# Patient Record
Sex: FEMALE | Race: WHITE | NOT HISPANIC OR LATINO | Employment: FULL TIME | URBAN - METROPOLITAN AREA
[De-identification: names, ages, dates, MRNs, and addresses within clinical notes are randomized per-mention and may not be internally consistent; named-entity substitution may affect disease eponyms.]

---

## 2017-09-14 ENCOUNTER — TRANSCRIBE ORDERS (OUTPATIENT)
Dept: ADMINISTRATIVE | Facility: HOSPITAL | Age: 62
End: 2017-09-14

## 2017-09-14 DIAGNOSIS — Z12.31 VISIT FOR SCREENING MAMMOGRAM: Primary | ICD-10-CM

## 2017-10-13 ENCOUNTER — HOSPITAL ENCOUNTER (OUTPATIENT)
Dept: MAMMOGRAPHY | Facility: CLINIC | Age: 62
Discharge: HOME | End: 2017-10-13
Payer: COMMERCIAL

## 2017-10-13 DIAGNOSIS — Z12.31 VISIT FOR SCREENING MAMMOGRAM: ICD-10-CM

## 2017-10-13 PROCEDURE — G0202 SCR MAMMO BI INCL CAD: HCPCS

## 2018-01-26 ENCOUNTER — OFFICE VISIT (OUTPATIENT)
Dept: URGENT CARE | Facility: CLINIC | Age: 63
End: 2018-01-26
Payer: COMMERCIAL

## 2018-01-26 VITALS
DIASTOLIC BLOOD PRESSURE: 80 MMHG | WEIGHT: 167.4 LBS | OXYGEN SATURATION: 100 % | HEIGHT: 68 IN | BODY MASS INDEX: 25.37 KG/M2 | HEART RATE: 89 BPM | SYSTOLIC BLOOD PRESSURE: 121 MMHG | RESPIRATION RATE: 16 BRPM | TEMPERATURE: 99.9 F

## 2018-01-26 DIAGNOSIS — R68.89 FLU-LIKE SYMPTOMS: Primary | ICD-10-CM

## 2018-01-26 DIAGNOSIS — J20.9 ACUTE BRONCHITIS, UNSPECIFIED ORGANISM: ICD-10-CM

## 2018-01-26 PROCEDURE — 99213 OFFICE O/P EST LOW 20 MIN: CPT | Performed by: PHYSICIAN ASSISTANT

## 2018-01-26 RX ORDER — ALBUTEROL SULFATE 90 UG/1
2 AEROSOL, METERED RESPIRATORY (INHALATION) EVERY 4 HOURS PRN
Qty: 1 INHALER | Refills: 0 | Status: SHIPPED | OUTPATIENT
Start: 2018-01-26 | End: 2021-11-15 | Stop reason: CLARIF

## 2018-01-26 RX ORDER — PREDNISONE 50 MG/1
50 TABLET ORAL DAILY
Qty: 5 TABLET | Refills: 0 | Status: SHIPPED | OUTPATIENT
Start: 2018-01-26 | End: 2018-01-31

## 2018-01-26 NOTE — PATIENT INSTRUCTIONS
Take steroid as prescribed  Use inhaler every 4 hours for wheezing and cough  otc medicines as needed for symptom control  Follow up with family dr if symptoms worsen or persist

## 2018-01-26 NOTE — PROGRESS NOTES
Assessment/Plan:      Diagnoses and all orders for this visit:    Flu-like symptoms    Acute bronchitis, unspecified organism  -     predniSONE 50 mg tablet; Take 1 tablet by mouth daily for 5 days  -     albuterol (PROVENTIL HFA,VENTOLIN HFA) 90 mcg/act inhaler; Inhale 2 puffs every 4 (four) hours as needed for wheezing        Patient Instructions   Take steroid as prescribed  Use inhaler every 4 hours for wheezing and cough  otc medicines as needed for symptom control  Follow up with family dr if symptoms worsen or persist      Subjective:     Patient ID: Flakito Cortez is a 58 y o  female  Cough   This is a new problem  The current episode started in the past 7 days (3 days ago)  The problem has been gradually worsening  The problem occurs constantly  The cough is non-productive  Associated symptoms include chills, a fever, myalgias, nasal congestion, postnasal drip, rhinorrhea and wheezing  Pertinent negatives include no chest pain, ear congestion, ear pain, headaches, heartburn, hemoptysis, rash, sore throat, shortness of breath, sweats or weight loss  Nothing aggravates the symptoms  She has tried OTC cough suppressant for the symptoms  The treatment provided no relief  Review of Systems   Constitutional: Positive for chills, fatigue and fever  Negative for weight loss  HENT: Positive for congestion, postnasal drip, rhinorrhea and sinus pressure  Negative for ear pain, mouth sores and sore throat  Respiratory: Positive for cough and wheezing  Negative for hemoptysis, choking, chest tightness, shortness of breath and stridor  Cardiovascular: Negative for chest pain  Gastrointestinal: Negative  Negative for heartburn  Musculoskeletal: Positive for myalgias  Skin: Negative for rash  Neurological: Negative for headaches  Objective:     Physical Exam   Constitutional: She appears well-developed and well-nourished  HENT:   Head: Normocephalic     Right Ear: Hearing, tympanic membrane, external ear and ear canal normal    Left Ear: Hearing, external ear and ear canal normal    Nose: Mucosal edema and rhinorrhea present  Mouth/Throat: Oropharynx is clear and moist and mucous membranes are normal    Cardiovascular: Normal rate, regular rhythm and normal pulses  Pulmonary/Chest: Effort normal  She has wheezes (diffuse)  Abdominal: Normal appearance and bowel sounds are normal  There is no tenderness

## 2018-05-03 ENCOUNTER — TRANSCRIBE ORDERS (OUTPATIENT)
Dept: ADMINISTRATIVE | Facility: HOSPITAL | Age: 63
End: 2018-05-03

## 2018-05-03 DIAGNOSIS — Z85.3 HX OF BREAST CANCER: Primary | ICD-10-CM

## 2018-05-17 ENCOUNTER — HOSPITAL ENCOUNTER (OUTPATIENT)
Dept: RADIOLOGY | Facility: HOSPITAL | Age: 63
Discharge: HOME | End: 2018-05-17
Attending: OBSTETRICS & GYNECOLOGY
Payer: COMMERCIAL

## 2018-05-17 DIAGNOSIS — Z85.3 HX OF BREAST CANCER: ICD-10-CM

## 2018-05-17 PROCEDURE — C8908 MRI W/O FOL W/CONT, BREAST,: HCPCS

## 2018-05-17 PROCEDURE — 0159T HB CAD BREAST MRI: CPT

## 2019-10-01 ENCOUNTER — TRANSCRIBE ORDERS (OUTPATIENT)
Dept: ADMINISTRATIVE | Facility: HOSPITAL | Age: 64
End: 2019-10-01

## 2019-10-01 DIAGNOSIS — Z12.31 SCREENING MAMMOGRAM FOR HIGH-RISK PATIENT: Primary | ICD-10-CM

## 2020-07-09 ENCOUNTER — TRANSCRIBE ORDERS (OUTPATIENT)
Dept: ADMINISTRATIVE | Facility: HOSPITAL | Age: 65
End: 2020-07-09

## 2020-07-09 DIAGNOSIS — H90.A32 MIXED CONDUCTIVE AND SENSORINEURAL HEARING LOSS OF LEFT EAR WITH RESTRICTED HEARING OF RIGHT EAR: Primary | ICD-10-CM

## 2020-07-20 ENCOUNTER — HOSPITAL ENCOUNTER (OUTPATIENT)
Dept: MRI IMAGING | Facility: HOSPITAL | Age: 65
Discharge: HOME | End: 2020-07-20
Payer: COMMERCIAL

## 2020-07-20 DIAGNOSIS — H90.A32 MIXED CONDUCTIVE AND SENSORINEURAL HEARING LOSS OF LEFT EAR WITH RESTRICTED HEARING OF RIGHT EAR: ICD-10-CM

## 2020-07-20 PROCEDURE — A9585 GADOBUTROL INJECTION: HCPCS | Performed by: RADIOLOGY

## 2020-07-20 PROCEDURE — 70553 MRI BRAIN STEM W/O & W/DYE: CPT

## 2020-07-20 RX ADMIN — GADOBUTROL 7 ML: 604.72 INJECTION INTRAVENOUS at 13:30

## 2021-07-09 ENCOUNTER — HOSPITAL ENCOUNTER (EMERGENCY)
Facility: HOSPITAL | Age: 66
Discharge: HOME | End: 2021-07-09
Attending: EMERGENCY MEDICINE
Payer: COMMERCIAL

## 2021-07-09 ENCOUNTER — APPOINTMENT (EMERGENCY)
Dept: RADIOLOGY | Facility: HOSPITAL | Age: 66
End: 2021-07-09
Payer: COMMERCIAL

## 2021-07-09 ENCOUNTER — APPOINTMENT (EMERGENCY)
Dept: CT IMAGING | Facility: HOSPITAL | Age: 66
End: 2021-07-09
Payer: COMMERCIAL

## 2021-07-09 VITALS
RESPIRATION RATE: 18 BRPM | SYSTOLIC BLOOD PRESSURE: 145 MMHG | TEMPERATURE: 97.7 F | DIASTOLIC BLOOD PRESSURE: 59 MMHG | OXYGEN SATURATION: 97 % | WEIGHT: 169.75 LBS | BODY MASS INDEX: 25.81 KG/M2 | HEART RATE: 90 BPM

## 2021-07-09 DIAGNOSIS — N28.1 BILATERAL RENAL CYSTS: ICD-10-CM

## 2021-07-09 DIAGNOSIS — E04.2 MULTIPLE THYROID NODULES: ICD-10-CM

## 2021-07-09 DIAGNOSIS — R07.9 CHEST PAIN: ICD-10-CM

## 2021-07-09 DIAGNOSIS — K29.70 GASTRITIS: ICD-10-CM

## 2021-07-09 DIAGNOSIS — R55 SYNCOPE: Primary | ICD-10-CM

## 2021-07-09 DIAGNOSIS — I77.810 THORACIC AORTIC ECTASIA (HCC): ICD-10-CM

## 2021-07-09 LAB
ALBUMIN SERPL BCP-MCNC: 4 G/DL (ref 3.5–5)
ALP SERPL-CCNC: 98 U/L (ref 46–116)
ALT SERPL W P-5'-P-CCNC: 22 U/L (ref 12–78)
ANION GAP SERPL CALCULATED.3IONS-SCNC: 9 MMOL/L (ref 4–13)
AST SERPL W P-5'-P-CCNC: 22 U/L (ref 5–45)
BACTERIA UR QL AUTO: NORMAL /HPF
BASOPHILS # BLD AUTO: 0.04 THOUSANDS/ΜL (ref 0–0.1)
BASOPHILS NFR BLD AUTO: 1 % (ref 0–1)
BILIRUB SERPL-MCNC: 0.31 MG/DL (ref 0.2–1)
BILIRUB UR QL STRIP: NEGATIVE
BUN SERPL-MCNC: 15 MG/DL (ref 5–25)
CALCIUM SERPL-MCNC: 9.3 MG/DL (ref 8.3–10.1)
CHLORIDE SERPL-SCNC: 104 MMOL/L (ref 100–108)
CK SERPL-CCNC: 78 U/L (ref 26–192)
CLARITY UR: CLEAR
CO2 SERPL-SCNC: 25 MMOL/L (ref 21–32)
COLOR UR: YELLOW
CREAT SERPL-MCNC: 1.05 MG/DL (ref 0.6–1.3)
EOSINOPHIL # BLD AUTO: 0.09 THOUSAND/ΜL (ref 0–0.61)
EOSINOPHIL NFR BLD AUTO: 2 % (ref 0–6)
ERYTHROCYTE [DISTWIDTH] IN BLOOD BY AUTOMATED COUNT: 13 % (ref 11.6–15.1)
GFR SERPL CREATININE-BSD FRML MDRD: 56 ML/MIN/1.73SQ M
GLUCOSE SERPL-MCNC: 94 MG/DL (ref 65–140)
GLUCOSE UR STRIP-MCNC: NEGATIVE MG/DL
HCT VFR BLD AUTO: 36.3 % (ref 34.8–46.1)
HGB BLD-MCNC: 11.9 G/DL (ref 11.5–15.4)
HGB UR QL STRIP.AUTO: NEGATIVE
IMM GRANULOCYTES # BLD AUTO: 0.02 THOUSAND/UL (ref 0–0.2)
IMM GRANULOCYTES NFR BLD AUTO: 0 % (ref 0–2)
INR PPP: 1.01 (ref 0.84–1.19)
KETONES UR STRIP-MCNC: NEGATIVE MG/DL
LEUKOCYTE ESTERASE UR QL STRIP: ABNORMAL
LIPASE SERPL-CCNC: 127 U/L (ref 73–393)
LYMPHOCYTES # BLD AUTO: 1.78 THOUSANDS/ΜL (ref 0.6–4.47)
LYMPHOCYTES NFR BLD AUTO: 36 % (ref 14–44)
MAGNESIUM SERPL-MCNC: 2.1 MG/DL (ref 1.6–2.6)
MCH RBC QN AUTO: 28.2 PG (ref 26.8–34.3)
MCHC RBC AUTO-ENTMCNC: 32.8 G/DL (ref 31.4–37.4)
MCV RBC AUTO: 86 FL (ref 82–98)
MONOCYTES # BLD AUTO: 0.39 THOUSAND/ΜL (ref 0.17–1.22)
MONOCYTES NFR BLD AUTO: 8 % (ref 4–12)
NEUTROPHILS # BLD AUTO: 2.57 THOUSANDS/ΜL (ref 1.85–7.62)
NEUTS SEG NFR BLD AUTO: 53 % (ref 43–75)
NITRITE UR QL STRIP: NEGATIVE
NON-SQ EPI CELLS URNS QL MICRO: NORMAL /HPF
NRBC BLD AUTO-RTO: 0 /100 WBCS
PH UR STRIP.AUTO: 7 [PH]
PLATELET # BLD AUTO: 303 THOUSANDS/UL (ref 149–390)
PMV BLD AUTO: 9.6 FL (ref 8.9–12.7)
POTASSIUM SERPL-SCNC: 4.1 MMOL/L (ref 3.5–5.3)
PROT SERPL-MCNC: 8.1 G/DL (ref 6.4–8.2)
PROT UR STRIP-MCNC: NEGATIVE MG/DL
PROTHROMBIN TIME: 13.1 SECONDS (ref 11.6–14.5)
RBC # BLD AUTO: 4.22 MILLION/UL (ref 3.81–5.12)
RBC #/AREA URNS AUTO: NORMAL /HPF
SODIUM SERPL-SCNC: 138 MMOL/L (ref 136–145)
SP GR UR STRIP.AUTO: <=1.005 (ref 1–1.03)
TROPONIN I SERPL-MCNC: <0.02 NG/ML
TROPONIN I SERPL-MCNC: <0.02 NG/ML
TSH SERPL DL<=0.05 MIU/L-ACNC: 1.11 UIU/ML (ref 0.36–3.74)
UROBILINOGEN UR QL STRIP.AUTO: 0.2 E.U./DL
WBC # BLD AUTO: 4.89 THOUSAND/UL (ref 4.31–10.16)
WBC #/AREA URNS AUTO: NORMAL /HPF

## 2021-07-09 PROCEDURE — 85610 PROTHROMBIN TIME: CPT | Performed by: EMERGENCY MEDICINE

## 2021-07-09 PROCEDURE — 82550 ASSAY OF CK (CPK): CPT | Performed by: EMERGENCY MEDICINE

## 2021-07-09 PROCEDURE — G1004 CDSM NDSC: HCPCS

## 2021-07-09 PROCEDURE — 85025 COMPLETE CBC W/AUTO DIFF WBC: CPT | Performed by: EMERGENCY MEDICINE

## 2021-07-09 PROCEDURE — 74174 CTA ABD&PLVS W/CONTRAST: CPT

## 2021-07-09 PROCEDURE — 36415 COLL VENOUS BLD VENIPUNCTURE: CPT | Performed by: EMERGENCY MEDICINE

## 2021-07-09 PROCEDURE — 83690 ASSAY OF LIPASE: CPT | Performed by: EMERGENCY MEDICINE

## 2021-07-09 PROCEDURE — 81001 URINALYSIS AUTO W/SCOPE: CPT | Performed by: EMERGENCY MEDICINE

## 2021-07-09 PROCEDURE — 84484 ASSAY OF TROPONIN QUANT: CPT | Performed by: EMERGENCY MEDICINE

## 2021-07-09 PROCEDURE — 71045 X-RAY EXAM CHEST 1 VIEW: CPT

## 2021-07-09 PROCEDURE — 93005 ELECTROCARDIOGRAM TRACING: CPT

## 2021-07-09 PROCEDURE — 83735 ASSAY OF MAGNESIUM: CPT | Performed by: EMERGENCY MEDICINE

## 2021-07-09 PROCEDURE — 99285 EMERGENCY DEPT VISIT HI MDM: CPT | Performed by: EMERGENCY MEDICINE

## 2021-07-09 PROCEDURE — 71275 CT ANGIOGRAPHY CHEST: CPT

## 2021-07-09 PROCEDURE — 96374 THER/PROPH/DIAG INJ IV PUSH: CPT

## 2021-07-09 PROCEDURE — 80053 COMPREHEN METABOLIC PANEL: CPT | Performed by: EMERGENCY MEDICINE

## 2021-07-09 PROCEDURE — 99285 EMERGENCY DEPT VISIT HI MDM: CPT

## 2021-07-09 PROCEDURE — 84443 ASSAY THYROID STIM HORMONE: CPT | Performed by: EMERGENCY MEDICINE

## 2021-07-09 PROCEDURE — 96361 HYDRATE IV INFUSION ADD-ON: CPT

## 2021-07-09 RX ORDER — ASPIRIN 81 MG/1
324 TABLET, CHEWABLE ORAL ONCE
Status: COMPLETED | OUTPATIENT
Start: 2021-07-09 | End: 2021-07-09

## 2021-07-09 RX ORDER — FAMOTIDINE 20 MG/1
20 TABLET, FILM COATED ORAL DAILY
Qty: 30 TABLET | Refills: 0 | Status: SHIPPED | OUTPATIENT
Start: 2021-07-09 | End: 2021-11-15 | Stop reason: CLARIF

## 2021-07-09 RX ORDER — SODIUM CHLORIDE 9 MG/ML
3 INJECTION INTRAVENOUS
Status: DISCONTINUED | OUTPATIENT
Start: 2021-07-09 | End: 2021-07-09 | Stop reason: HOSPADM

## 2021-07-09 RX ORDER — LIDOCAINE HYDROCHLORIDE 20 MG/ML
10 SOLUTION OROPHARYNGEAL ONCE
Status: COMPLETED | OUTPATIENT
Start: 2021-07-09 | End: 2021-07-09

## 2021-07-09 RX ORDER — MAGNESIUM HYDROXIDE/ALUMINUM HYDROXICE/SIMETHICONE 120; 1200; 1200 MG/30ML; MG/30ML; MG/30ML
15 SUSPENSION ORAL ONCE
Status: COMPLETED | OUTPATIENT
Start: 2021-07-09 | End: 2021-07-09

## 2021-07-09 RX ADMIN — ASPIRIN 81 MG CHEWABLE TABLET 324 MG: 81 TABLET CHEWABLE at 15:17

## 2021-07-09 RX ADMIN — FAMOTIDINE 20 MG: 10 INJECTION INTRAVENOUS at 13:27

## 2021-07-09 RX ADMIN — ALUMINUM HYDROXIDE, MAGNESIUM HYDROXIDE, AND SIMETHICONE 15 ML: 200; 200; 20 SUSPENSION ORAL at 13:30

## 2021-07-09 RX ADMIN — IOHEXOL 100 ML: 350 INJECTION, SOLUTION INTRAVENOUS at 13:52

## 2021-07-09 RX ADMIN — LIDOCAINE HYDROCHLORIDE 10 ML: 20 SOLUTION ORAL; TOPICAL at 13:29

## 2021-07-09 RX ADMIN — SODIUM CHLORIDE 1000 ML: 0.9 INJECTION, SOLUTION INTRAVENOUS at 13:00

## 2021-07-09 NOTE — ED NOTES
Aassisted OOB to BR- voided 800ml clear yellow urine - specimen sent to lab - denies dizziness, CP or SOB     Atul Nichole RN  07/09/21 1839

## 2021-07-09 NOTE — ED PROVIDER NOTES
History  Chief Complaint   Patient presents with    Syncope     pt states she was getting her nails done and started feeling hot and dizzy, pt then passed out  pt states episode of pressure in chest that radiated to back but has since resolved  pt denies CP, SOB, N/V      57-year-old female with a past medical history of degenerative joint disease, GERD, osteoarthritis status post right hip replacement presents with one syncopal event today  Patient states she was sitting down at a nail salon getting her nails done  Technician was pulling hard to pull off a nail extension from her right hand when patient felt severe pain, hot and dizzy, and passed out, family member at bedside states that patient sat back in the chair with her head off to the left and her eyes closed for approximately two minutes before patient woke up  Patient was immediately back to her baseline  Denies history of syncope, cardiac arrhythmia, seizure disorder  Patient states she felt a little queasy due to the pain but that she has no symptoms at this time  Patient also states she had epigastric and substernal chest pain radiating to her back earlier today  Denies history of aortic dissection or pancreatitis  Patient does states she has a history of gastritis but is not on medication  Denies chest or abdominal pain in the ER  No recent changes to medications  No recent illnesses  Patient denies illicit drug use  Review of medical chart shows no recent hospitalizations  Family is at bedside  History provided by:  Patient, medical records and relative   used: No    Syncope  Episode history:  Single  Most recent episode:   Today  Duration:  2 minutes  Timing:  Constant  Progression:  Resolved  Chronicity:  New  Context: sitting down    Context: not blood draw, not bowel movement, not dehydration, not exertion, not inactivity, not medication change, not with normal activity, not sight of blood, not standing up and not urination    Witnessed: yes    Relieved by:  Nothing  Worsened by:  Nothing  Ineffective treatments:  None tried  Associated symptoms: no anxiety, no chest pain, no confusion, no diaphoresis, no difficulty breathing, no dizziness, no fever, no focal sensory loss, no focal weakness, no headaches, no malaise/fatigue, no nausea, no palpitations, no recent fall, no recent injury, no recent surgery, no rectal bleeding, no seizures, no shortness of breath, no visual change, no vomiting and no weakness    Risk factors: no congenital heart disease, no coronary artery disease, no seizures and no vascular disease        Prior to Admission Medications   Prescriptions Last Dose Informant Patient Reported? Taking? albuterol (PROVENTIL HFA,VENTOLIN HFA) 90 mcg/act inhaler   No No   Sig: Inhale 2 puffs every 4 (four) hours as needed for wheezing   calcium carbonate (OS-PRETTY) 1250 (500 CA) MG tablet   Yes No   Sig: Take 1 tablet by mouth daily  Facility-Administered Medications: None       Past Medical History:   Diagnosis Date    History of right breast cancer        Past Surgical History:   Procedure Laterality Date     SECTION      JOINT REPLACEMENT      TOTAL HIP ARTHROPLASTY      US GUIDED BREAST BIOPSY LEFT COMPLETE Left 2016       History reviewed  No pertinent family history  I have reviewed and agree with the history as documented  E-Cigarette/Vaping    E-Cigarette Use Never User      E-Cigarette/Vaping Substances     Social History     Tobacco Use    Smoking status: Never Smoker    Smokeless tobacco: Never Used   Vaping Use    Vaping Use: Never used   Substance Use Topics    Alcohol use: Never    Drug use: Never       Review of Systems   Constitutional: Negative for chills, diaphoresis, fatigue, fever and malaise/fatigue  HENT: Negative for congestion, drooling, facial swelling, nosebleeds, sneezing, trouble swallowing and voice change      Eyes: Negative for photophobia, pain and visual disturbance  Respiratory: Negative for cough, chest tightness, shortness of breath and wheezing  Cardiovascular: Positive for syncope  Negative for chest pain, palpitations and leg swelling  Gastrointestinal: Negative for abdominal pain, constipation, diarrhea, nausea and vomiting  Genitourinary: Negative for decreased urine volume, difficulty urinating, dysuria, flank pain, frequency, hematuria, urgency, vaginal bleeding and vaginal discharge  Musculoskeletal: Negative for arthralgias, back pain, myalgias, neck pain and neck stiffness  Skin: Negative for color change, pallor, rash and wound  Allergic/Immunologic: Negative for immunocompromised state  Neurological: Positive for syncope  Negative for dizziness, tremors, focal weakness, seizures, facial asymmetry, speech difficulty, weakness, light-headedness, numbness and headaches  Hematological: Negative for adenopathy  Psychiatric/Behavioral: Negative for agitation, confusion, hallucinations and suicidal ideas  The patient is not nervous/anxious  Physical Exam  Physical Exam  Vitals and nursing note reviewed  Exam conducted with a chaperone present  Constitutional:       General: She is not in acute distress  Appearance: Normal appearance  She is well-developed and normal weight  She is not ill-appearing, toxic-appearing or diaphoretic  Comments: Well appearing, in no acute distress   HENT:      Head: Normocephalic and atraumatic  Jaw: There is normal jaw occlusion  Right Ear: Hearing, tympanic membrane, ear canal and external ear normal  There is no impacted cerumen  No mastoid tenderness  No hemotympanum  Left Ear: Hearing, tympanic membrane, ear canal and external ear normal  There is no impacted cerumen  No mastoid tenderness  No hemotympanum  Nose: Nose normal       Right Nostril: No epistaxis  Left Nostril: No epistaxis        Right Sinus: No maxillary sinus tenderness or frontal sinus tenderness  Left Sinus: No maxillary sinus tenderness or frontal sinus tenderness  Mouth/Throat:      Lips: Pink  No lesions  Mouth: Mucous membranes are moist  No lacerations or angioedema  Tongue: No lesions  Tongue does not deviate from midline  Palate: No mass and lesions  Pharynx: Oropharynx is clear  Uvula midline  No pharyngeal swelling, oropharyngeal exudate, posterior oropharyngeal erythema or uvula swelling  Tonsils: No tonsillar exudate or tonsillar abscesses  Eyes:      General: Lids are normal  Vision grossly intact  Gaze aligned appropriately  No visual field deficit or scleral icterus  Right eye: No discharge  Left eye: No discharge  Extraocular Movements: Extraocular movements intact  Right eye: No nystagmus  Left eye: No nystagmus  Conjunctiva/sclera: Conjunctivae normal       Right eye: Right conjunctiva is not injected  Left eye: Left conjunctiva is not injected  Pupils: Pupils are equal, round, and reactive to light  Neck:      Thyroid: No thyroid mass or thyromegaly  Trachea: Trachea and phonation normal    Cardiovascular:      Rate and Rhythm: Normal rate and regular rhythm  Pulses: Normal pulses  Radial pulses are 2+ on the right side and 2+ on the left side  Dorsalis pedis pulses are 2+ on the right side and 2+ on the left side  Heart sounds: Normal heart sounds, S1 normal and S2 normal    Pulmonary:      Effort: Pulmonary effort is normal  No tachypnea, accessory muscle usage, respiratory distress or retractions  Breath sounds: Normal breath sounds and air entry  No stridor or decreased air movement  No decreased breath sounds, wheezing, rhonchi or rales  Chest:      Chest wall: No tenderness  Abdominal:      General: Abdomen is flat  Bowel sounds are normal  There is no distension  Palpations: Abdomen is soft  Abdomen is not rigid  There is no mass  Tenderness: There is no abdominal tenderness  There is no right CVA tenderness, left CVA tenderness, guarding or rebound  Negative signs include Avalos's sign and McBurney's sign  Hernia: No hernia is present  Musculoskeletal:         General: No swelling, tenderness, deformity or signs of injury  Normal range of motion  Cervical back: Full passive range of motion without pain, normal range of motion and neck supple  No rigidity  No spinous process tenderness or muscular tenderness  Right lower leg: No edema  Left lower leg: No edema  Lymphadenopathy:      Cervical: No cervical adenopathy  Skin:     General: Skin is warm and dry  Capillary Refill: Capillary refill takes less than 2 seconds  Coloration: Skin is not ashen, cyanotic, jaundiced, mottled, pale or sallow  Findings: No abrasion, abscess, acne, bruising, burn, ecchymosis, erythema, signs of injury, laceration, lesion, petechiae, rash or wound  Rash is not macular or papular  Neurological:      General: No focal deficit present  Mental Status: She is alert and oriented to person, place, and time  Mental status is at baseline  She is not disoriented  GCS: GCS eye subscore is 4  GCS verbal subscore is 5  GCS motor subscore is 6  Cranial Nerves: Cranial nerves are intact  No cranial nerve deficit, dysarthria or facial asymmetry  Sensory: Sensation is intact  No sensory deficit  Motor: Motor function is intact  No weakness, tremor, atrophy, abnormal muscle tone or seizure activity  Coordination: Coordination is intact  Coordination normal       Gait: Gait is intact  Gait normal       Comments: Patient is AAOx4, GCS 15; speaking clearly and appropriately; motor and sensation intact; visual fields intact; cranial nerves II-XII grossly intact; no facial droop, slurred speech or arm drift   Psychiatric:         Attention and Perception: Attention and perception normal  She is attentive  Mood and Affect: Mood and affect normal          Speech: Speech normal          Behavior: Behavior normal  Behavior is cooperative  Thought Content:  Thought content normal          Cognition and Memory: Cognition and memory normal          Judgment: Judgment normal          Vital Signs  ED Triage Vitals   Temperature Pulse Respirations Blood Pressure SpO2   07/09/21 1208 07/09/21 1208 07/09/21 1208 07/09/21 1211 07/09/21 1208   97 7 °F (36 5 °C) 71 18 140/67 100 %      Temp Source Heart Rate Source Patient Position - Orthostatic VS BP Location FiO2 (%)   07/09/21 1208 07/09/21 1208 07/09/21 1208 07/09/21 1208 --   Temporal Monitor Lying Left arm       Pain Score       --                  Vitals:    07/09/21 1530 07/09/21 1545 07/09/21 1600 07/09/21 1615   BP: 130/72 125/67 134/67 170/81   Pulse: 70 79 72 85   Patient Position - Orthostatic VS: Sitting  Lying Lying         Visual Acuity      ED Medications  Medications   sodium chloride (PF) 0 9 % injection 3 mL (has no administration in time range)   sodium chloride 0 9 % bolus 1,000 mL (0 mL Intravenous Stopped 7/9/21 1515)   famotidine (PEPCID) injection 20 mg (20 mg Intravenous Given 7/9/21 1327)   Lidocaine Viscous HCl (XYLOCAINE) 2 % mucosal solution 10 mL (10 mL Swish & Swallow Given 7/9/21 1329)   aluminum-magnesium hydroxide-simethicone (MYLANTA) oral suspension 15 mL (15 mL Oral Given 7/9/21 1330)   iohexol (OMNIPAQUE) 350 MG/ML injection (SINGLE-DOSE) 100 mL (100 mL Intravenous Given 7/9/21 1352)   aspirin chewable tablet 324 mg (324 mg Oral Given 7/9/21 1517)       Diagnostic Studies  Results Reviewed     Procedure Component Value Units Date/Time    Troponin I repeat in 3hrs [[id number]]  (Normal) Collected: 07/09/21 1610    Lab Status: Final result Specimen: Blood from Arm, Left Updated: 07/09/21 1633     Troponin I <0 02 ng/mL     Urine Microscopic [[id number]]  (Normal) Collected: 07/09/21 1516    Lab Status: Final result Specimen: Urine, Clean Catch Updated: 07/09/21 1527     RBC, UA 1-2 /hpf      WBC, UA 2-4 /hpf      Epithelial Cells Occasional /hpf      Bacteria, UA None Seen /hpf     UA w Reflex to Microscopic w Reflex to Culture [[id number]]  (Abnormal) Collected: 07/09/21 1516    Lab Status: Final result Specimen: Urine, Clean Catch Updated: 07/09/21 1521     Color, UA Yellow     Clarity, UA Clear     Specific Gravity, UA <=1 005     pH, UA 7 0     Leukocytes, UA Trace     Nitrite, UA Negative     Protein, UA Negative mg/dl      Glucose, UA Negative mg/dl      Ketones, UA Negative mg/dl      Urobilinogen, UA 0 2 E U /dl      Bilirubin, UA Negative     Blood, UA Negative    TSH, 3rd generation [[id number]]  (Normal) Collected: 07/09/21 1232    Lab Status: Final result Specimen: Blood from Arm, Left Updated: 07/09/21 1306     TSH 3RD GENERATON 1 115 uIU/mL     Narrative:      Patients undergoing fluorescein dye angiography may retain small amounts of fluorescein in the body for 48-72 hours post procedure  Samples containing fluorescein can produce falsely depressed TSH values  If the patient had this procedure,a specimen should be resubmitted post fluorescein clearance        Troponin I [[id number]]  (Normal) Collected: 07/09/21 1232    Lab Status: Final result Specimen: Blood from Arm, Left Updated: 07/09/21 1259     Troponin I <0 02 ng/mL     Protime-INR [[id number]]  (Normal) Collected: 07/09/21 1232    Lab Status: Final result Specimen: Blood from Arm, Left Updated: 07/09/21 1254     Protime 13 1 seconds      INR 1 01    Lipase [[id number]]  (Normal) Collected: 07/09/21 1232    Lab Status: Final result Specimen: Blood from Arm, Left Updated: 07/09/21 1254     Lipase 127 u/L     Comprehensive metabolic panel [[id number]] Collected: 07/09/21 1232    Lab Status: Final result Specimen: Blood from Arm, Left Updated: 07/09/21 1254     Sodium 138 mmol/L      Potassium 4 1 mmol/L      Chloride 104 mmol/L      CO2 25 mmol/L      ANION GAP 9 mmol/L      BUN 15 mg/dL      Creatinine 1 05 mg/dL      Glucose 94 mg/dL      Calcium 9 3 mg/dL      AST 22 U/L      ALT 22 U/L      Alkaline Phosphatase 98 U/L      Total Protein 8 1 g/dL      Albumin 4 0 g/dL      Total Bilirubin 0 31 mg/dL      eGFR 56 ml/min/1 73sq m     Narrative:      [place] guidelines for Chronic Kidney Disease (CKD):     Stage 1 with normal or high GFR (GFR > 90 mL/min/1 73 square meters)    Stage 2 Mild CKD (GFR = 60-89 mL/min/1 73 square meters)    Stage 3A Moderate CKD (GFR = 45-59 mL/min/1 73 square meters)    Stage 3B Moderate CKD (GFR = 30-44 mL/min/1 73 square meters)    Stage 4 Severe CKD (GFR = 15-29 mL/min/1 73 square meters)    Stage 5 End Stage CKD (GFR <15 mL/min/1 73 square meters)  Note: GFR calculation is accurate only with a steady state creatinine    Magnesium [[id number]]  (Normal) Collected: 07/09/21 1232    Lab Status: Final result Specimen: Blood from Arm, Left Updated: 07/09/21 1254     Magnesium 2 1 mg/dL     CK (with reflex to MB) [[id number]]  (Normal) Collected: 07/09/21 1232    Lab Status: Final result Specimen: Blood from Arm, Left Updated: 07/09/21 1254     Total CK 78 U/L     CBC and differential [[id number]] Collected: 07/09/21 1232    Lab Status: Final result Specimen: Blood from Arm, Left Updated: 07/09/21 1239     WBC 4 89 Thousand/uL      RBC 4 22 Million/uL      Hemoglobin 11 9 g/dL      Hematocrit 36 3 %      MCV 86 fL      MCH 28 2 pg      MCHC 32 8 g/dL      RDW 13 0 %      MPV 9 6 fL      Platelets 884 Thousands/uL      nRBC 0 /100 WBCs      Neutrophils Relative 53 %      Immat GRANS % 0 %      Lymphocytes Relative 36 %      Monocytes Relative 8 %      Eosinophils Relative 2 %      Basophils Relative 1 %      Neutrophils Absolute 2 57 Thousands/µL      Immature Grans Absolute 0 02 Thousand/uL      Lymphocytes Absolute 1 78 Thousands/µL      Monocytes Absolute 0 39 Thousand/µL      Eosinophils Absolute 0 09 Thousand/µL      Basophils Absolute 0 04 Thousands/µL                  CTA dissection protocol chest/abdomen/pelvis   Final Result by Konrad Macias MD (07/09 1416)      No evidence of acute aortic syndrome  No acute findings in the chest, abdomen or pelvis  Workstation performed: [id number]         X-ray chest 1 view portable   ED Interpretation by Carl Thrasher MD (07/09 1306)   Chest x-ray read and interpreted by me  Negative for pneumothorax, mediastinal widening, rib fracture, focal consolidation or pleural effusion  Final Result by Salvador Morris MD (07/09 1420)      No acute cardiopulmonary disease  Workstation performed: [id number]                    Procedures  ECG 12 Lead Documentation Only    Date/Time: 7/9/2021 12:10 PM  Performed by: Carl Thrasher MD  Authorized by: Carl Thrasher MD     Indications / Diagnosis:  Syncope  ECG reviewed by me, the ED Provider: yes    Patient location:  ED  Previous ECG:     Comparison to cardiac monitor: Yes    Interpretation:     Interpretation: normal    Rate:     ECG rate:  63bpm    ECG rate assessment: normal    Rhythm:     Rhythm: sinus rhythm    Ectopy:     Ectopy: none    QRS:     QRS axis:  Normal  Conduction:     Conduction: normal    ST segments:     ST segments:  Normal  T waves:     T waves: flattening and inverted      Flattening:  AVL and V2    Inverted:  AVR and V1  Comments:      No STEMI  CA 206ms  QRS 78ms  QT 425ms    Cardiac monitoring ordered  Heart rate and rhythm as above  I have personally read and interpreted the EKG as above  ECG 12 Lead Documentation Only    Date/Time: 7/9/2021 4:07 PM  Performed by: Carl Thrasher MD  Authorized by:  Carl Thrasher MD     Indications / Diagnosis:  Chest pain  ECG reviewed by me, the ED Provider: yes    Patient location:  ED  Previous ECG:     Comparison to cardiac monitor: Yes    Interpretation:     Interpretation: normal    Rate:     ECG rate:  73bpm    ECG rate assessment: normal    Rhythm:     Rhythm: sinus rhythm    Ectopy:     Ectopy: none    QRS:     QRS axis:  Normal  Conduction:     Conduction: normal    ST segments:     ST segments:  Normal  T waves:     T waves: flattening and inverted      Flattening:  AVL    Inverted:  AVR and V1  Comments:      No STEMI  NE 196ms  QRS 78ms  QT 458ms    Cardiac monitoring ordered  Heart rate and rhythm as above  I have personally read and interpreted the EKG as above  ED Course  ED Course as of Jul 09 1642 Fri Jul 09, 2021   1356 Reassess patient  She has no complaints at this time and denies chest pain  1358 Repeat troponin and EKG at 1530      1400 HEART Score 2      1401 Orthostatic positive      1439 CTA:IMPRESSION:     No evidence of acute aortic syndrome      No acute findings in the chest, abdomen or pelvis         1440 CXR:IMPRESSION:     No acute cardiopulmonary disease  [address] patient  Patient has had no chest pain or syncopal events while in the emergency department  She has no complaints  Reviewed labs and imaging  Plan for discharge with primary care provider follow-up for possible gastritis  Will place her on Pepcid  Cardiology referral for chest pain and syncopal event  Vascular surgery referral for ectatic ascending thoracic aorta  Work note provided  Strict return to ER precautions discussed with and acknowledged by patient            1641 Repeat troponin normal             MDM  Number of Diagnoses or Management Options     Amount and/or Complexity of Data Reviewed  Clinical lab tests: reviewed and ordered  Tests in the radiology section of CPT®: reviewed and ordered  Tests in the medicine section of CPT®: ordered and reviewed  Obtain history from someone other than the patient: yes (Family at bedside)  Review and summarize past medical records: yes  Independent visualization of images, tracings, or specimens: yes (EKG, chest x-ray)    Risk of Complications, Morbidity, and/or Mortality  Presenting problems: moderate  Diagnostic procedures: moderate  Management options: moderate    Patient Progress  Patient progress: stable      Disposition  Final diagnoses:   Syncope   Chest pain   Thoracic aortic ectasia (HCC)   Multiple thyroid nodules   Bilateral renal cysts   Gastritis     Time reflects when diagnosis was documented in both MDM as applicable and the Disposition within this note     Time User Action Codes Description Comment    7/9/2021  1:21 PM Idalmis Emelia Add [R55] Syncope     7/9/2021  2:01 PM Idalmis Emelia Add [R07 9] Chest pain     7/9/2021  2:41 PM Idalmis Emelia Add [T80 825] Thoracic aortic ectasia ([address] )     7/9/2021  2:41 PM Idalmis Emelia Add [E04 2] Multiple thyroid nodules     7/9/2021  2:42 PM Idalmis Emelia Add [N28 1] Bilateral renal cysts     7/9/2021  4:25 PM Idalmis Emelia Add [K29 70] Gastritis       ED Disposition     None      Follow-up Information     Follow up With Specialties Details Why Contact Info    Christin Gil DO Family Medicine Call in 1 day Please follow-up with your primary care provider regarding your thyroid nodules as you may require outpatient workup  [address] [place], [place] [address]      Chun Hwang, DO Cardiology In 3 days Please follow-up with cardiology within 72 hours for outpatient evaluation of your chest pain and syncope  [address] [place] [address]      Shanel Conner MD Vascular Surgery, General Surgery Call in 2 days Please follow-up with vascular surgery regarding your mild ectasia of ascending thoracic aorta  [address] [facility] [address] [facility]      Marcus Tan MD Internal Medicine, Bariatrics, Gastroenterology Call in 1 week Please follow-up with gastroenterologist for your gastritis as you may require EGD   [address] [facility] [address]  [phone]            Patient's Medications   Discharge Prescriptions    FAMOTIDINE (PEPCID) 20 MG TABLET    Take 1 tablet (20 mg total) by mouth daily       Start Date: 7/9/2021  End Date: --       Order Dose: 20 mg       Quantity: 30 tablet    Refills: 0         PDMP Review     None          ED Provider  Electronically Signed by    MD Yrn Elam MD  07/09/21 4486

## 2021-07-09 NOTE — ED NOTES
Family at bedside - pt comfortable - denies chest pain or dizziness     Bhupendra Cyr RN  07/09/21 1766

## 2021-07-09 NOTE — Clinical Note
Nory Halkamila was seen and treated in our emergency department on 7/9/2021  Diagnosis:     Natasha Romero  may return to work on return date  She may return on this date: 07/12/2021         If you have any questions or concerns, please don't hesitate to call        Ingris Goel MD    ______________________________           _______________          _______________  Hospital Representative                              Date                                Time

## 2021-07-09 NOTE — Clinical Note
accompanied Dianne Gongora to the emergency department on 7/9/2021  Return date if applicable: 17/73/5366        If you have any questions or concerns, please don't hesitate to call        Enedelia Barragan MD

## 2021-07-12 LAB
ATRIAL RATE: 63 BPM
P AXIS: 68 DEGREES
PR INTERVAL: 206 MS
QRS AXIS: 36 DEGREES
QRSD INTERVAL: 78 MS
QT INTERVAL: 416 MS
QTC INTERVAL: 425 MS
T WAVE AXIS: 63 DEGREES
VENTRICULAR RATE: 63 BPM

## 2021-07-12 PROCEDURE — 93010 ELECTROCARDIOGRAM REPORT: CPT | Performed by: INTERNAL MEDICINE

## 2021-07-13 LAB
ATRIAL RATE: 73 BPM
P AXIS: 73 DEGREES
PR INTERVAL: 196 MS
QRS AXIS: 56 DEGREES
QRSD INTERVAL: 78 MS
QT INTERVAL: 416 MS
QTC INTERVAL: 458 MS
T WAVE AXIS: 75 DEGREES
VENTRICULAR RATE: 73 BPM

## 2021-08-02 ENCOUNTER — HOSPITAL ENCOUNTER (EMERGENCY)
Facility: HOSPITAL | Age: 66
Discharge: HOME | End: 2021-08-02
Attending: EMERGENCY MEDICINE
Payer: COMMERCIAL

## 2021-08-02 VITALS
HEIGHT: 68 IN | WEIGHT: 174.6 LBS | OXYGEN SATURATION: 97 % | TEMPERATURE: 98.1 F | BODY MASS INDEX: 26.46 KG/M2 | SYSTOLIC BLOOD PRESSURE: 128 MMHG | RESPIRATION RATE: 17 BRPM | DIASTOLIC BLOOD PRESSURE: 72 MMHG | HEART RATE: 70 BPM
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
## 2021-08-02 PROCEDURE — 90675 RABIES VACCINE IM: CPT | Performed by: PHYSICIAN ASSISTANT

## 2021-08-02 PROCEDURE — 90471 IMMUNIZATION ADMIN: CPT

## 2021-08-02 PROCEDURE — 90375 RABIES IG IM/SC: CPT | Performed by: PHYSICIAN ASSISTANT

## 2021-08-02 PROCEDURE — 99284 EMERGENCY DEPT VISIT MOD MDM: CPT | Performed by: EMERGENCY MEDICINE

## 2021-08-02 PROCEDURE — 96372 THER/PROPH/DIAG INJ SC/IM: CPT

## 2021-08-02 PROCEDURE — 99283 EMERGENCY DEPT VISIT LOW MDM: CPT

## 2021-08-02 RX ORDER — OMEPRAZOLE 20 MG/1
20 CAPSULE, DELAYED RELEASE ORAL AS NEEDED
COMMUNITY
Start: 2020-12-29 | End: 2022-05-16

## 2021-08-02 RX ORDER — AMOXICILLIN AND CLAVULANATE POTASSIUM 875; 125 MG/1; MG/1
1 TABLET, FILM COATED ORAL ONCE
Status: COMPLETED | OUTPATIENT
Start: 2021-08-02 | End: 2021-08-02

## 2021-08-02 RX ORDER — AMOXICILLIN AND CLAVULANATE POTASSIUM 875; 125 MG/1; MG/1
1 TABLET, FILM COATED ORAL EVERY 12 HOURS
Qty: 14 TABLET | Refills: 0 | Status: SHIPPED | OUTPATIENT
Start: 2021-08-02 | End: 2021-08-09

## 2021-08-02 RX ADMIN — RABIES VIRUS STRAIN PM-1503-3M ANTIGEN (PROPIOLACTONE INACTIVATED) AND WATER 1 ML: KIT at 10:58

## 2021-08-02 RX ADMIN — AMOXICILLIN AND CLAVULANATE POTASSIUM 1 TABLET: 875; 125 TABLET, FILM COATED ORAL at 10:57

## 2021-08-02 RX ADMIN — RABIES IMMUNE GLOBULIN (HUMAN) 1590 UNITS: 300 INJECTION, SOLUTION INFILTRATION; INTRAMUSCULAR at 10:58

## 2021-08-02 NOTE — ED PROVIDER NOTES
History  Chief Complaint   Patient presents with    Cat Bite     pt c/o left forearm/hand pain and swelling after stray cat bite 3 days ago  denies travel/fevers/sob/cough     27-year-old female presents the emergency department for evaluation of cat bite to left forearm  Patient states 3 days ago she was attempting to catch a stray cat when the cat bit her in the left forearm  Reports since arm has become swollen and painful  She denies any fevers, chills  She denies any drainage from the wound  She denies any red streaking up the arm  Patient states cat ran off  History provided by:  Patient  Cat Bite  Contact animal:  Cat  Location:  Shoulder/arm  Shoulder/arm injury location:  L forearm  Time since incident:  3 days  Pain details:     Quality:  Unable to specify    Severity:  Mild    Timing:  Constant    Progression:  Unchanged  Incident location:  Outside  Provoked: provoked    Notifications:  None  Animal's rabies vaccination status:  Unknown  Animal in possession: no    Tetanus status: will check with PCP  Relieved by:  None tried  Worsened by: Activity  Ineffective treatments:  None tried  Associated symptoms: swelling    Associated symptoms: no fever, no numbness and no rash        Prior to Admission Medications   Prescriptions Last Dose Informant Patient Reported? Taking? albuterol (PROVENTIL HFA,VENTOLIN HFA) 90 mcg/act inhaler Not Taking at Unknown time  No No   Sig: Inhale 2 puffs every 4 (four) hours as needed for wheezing   Patient not taking: Reported on 8/2/2021   calcium carbonate (OS-PRETTY) 1250 (500 CA) MG tablet 8/2/2021 at Unknown time  Yes Yes   Sig: Take 1 tablet by mouth daily     famotidine (PEPCID) 20 mg tablet Not Taking at Unknown time  No No   Sig: Take 1 tablet (20 mg total) by mouth daily   Patient not taking: Reported on 8/2/2021   omeprazole (PriLOSEC) 20 mg delayed release capsule Unknown at Unknown time  Yes No   Sig: Take 20 mg by mouth as needed Facility-Administered Medications: None       Past Medical History:   Diagnosis Date    History of right breast cancer        Past Surgical History:   Procedure Laterality Date     SECTION      JOINT REPLACEMENT      TOTAL HIP ARTHROPLASTY      US GUIDED BREAST BIOPSY LEFT COMPLETE Left 2016       History reviewed  No pertinent family history  I have reviewed and agree with the history as documented  E-Cigarette/Vaping    E-Cigarette Use Never User      E-Cigarette/Vaping Substances     Social History     Tobacco Use    Smoking status: Never Smoker    Smokeless tobacco: Never Used   Vaping Use    Vaping Use: Never used   Substance Use Topics    Alcohol use: Never    Drug use: Never       Review of Systems   Constitutional: Negative for appetite change, chills, diaphoresis, fatigue and fever  HENT: Negative  Respiratory: Negative  Cardiovascular: Negative  Gastrointestinal: Negative  Musculoskeletal: Positive for arthralgias  Left forearm swelling   Skin: Positive for wound  Negative for rash  Neurological: Negative  Negative for numbness  All other systems reviewed and are negative  Physical Exam  Physical Exam  Vitals and nursing note reviewed  Constitutional:       General: She is not in acute distress  Appearance: Normal appearance  She is normal weight  She is not ill-appearing, toxic-appearing or diaphoretic  HENT:      Head: Normocephalic and atraumatic  Nose: Nose normal       Mouth/Throat:      Mouth: Mucous membranes are moist    Eyes:      Conjunctiva/sclera: Conjunctivae normal    Pulmonary:      Effort: Pulmonary effort is normal    Musculoskeletal:         General: Swelling (Left forearm) and tenderness ( left forearm) present  No deformity or signs of injury  Normal range of motion  Comments: Normal range of motion of left hand, wrist, elbow   Skin:     General: Skin is warm        Capillary Refill: Capillary refill takes less than 2 seconds  Coloration: Skin is not jaundiced or pale  Findings: No bruising or erythema  Comments: No erythema however mild warmth and swelling to the left forearm  Small scabbed over cat bite my on the forearm  No drainage  Neurological:      General: No focal deficit present  Mental Status: She is alert and oriented to person, place, and time  Psychiatric:         Mood and Affect: Mood normal          Behavior: Behavior normal          Vital Signs  ED Triage Vitals [08/02/21 1004]   Temperature Pulse Respirations Blood Pressure SpO2   98 1 °F (36 7 °C) 72 17 155/81 99 %      Temp Source Heart Rate Source Patient Position - Orthostatic VS BP Location FiO2 (%)   Temporal Monitor Sitting Left arm --      Pain Score       7           Vitals:    08/02/21 1004 08/02/21 1030 08/02/21 1100   BP: 155/81 125/69 128/72   Pulse: 72 69 70   Patient Position - Orthostatic VS: Sitting Sitting Sitting         Visual Acuity      ED Medications  Medications   rabies immune globulin, human (HyperRAB) injection 1,590 Units (1,590 Units Infiltration Given 8/2/21 1058)   rabies vaccine, human diploid (IMOVAX RABIES) IM injection 1 mL (1 mL Intramuscular Given 8/2/21 1058)   amoxicillin-clavulanate (AUGMENTIN) 875-125 mg per tablet 1 tablet (1 tablet Oral Given 8/2/21 1057)       Diagnostic Studies  Results Reviewed     None                 No orders to display              Procedures  Procedures         ED Course                 MDM  Number of Diagnoses or Management Options  Cat bite, initial encounter: new and requires workup  Diagnosis management comments: 24-year-old female presents to the emergency department for evaluation of left forearm cat bite 3 days ago by stray cat  Vitals and medical record reviewed  Patient is afebrile  Denies fevers or chills at home  Rabies vaccine and immunoglobulin administered in the emergency department  Patient started on antibiotics    Physical exam consistent with cellulitis  There is no bony tenderness  Patient has normal range of motion of the hand  Normal pulses and normal cap refill  Will check with PCP on tetanus status  Discussed remainder vaccine reschedule, symptomatic treatment at home and symptoms that require prompt return to the ED for further evaluation patient verbalized understanding  She agreed to this treatment plan was discharged home  Amount and/or Complexity of Data Reviewed  Review and summarize past medical records: yes        Disposition  Final diagnoses:   Cat bite, initial encounter     Time reflects when diagnosis was documented in both MDM as applicable and the Disposition within this note     Time User Action Codes Description Comment    8/2/2021 10:11 AM Rosamaria Libman Add [W55 01XA] Cat bite, initial encounter       ED Disposition     ED Disposition Condition Date/Time Comment    Discharge Stable Mon Aug 2, 2021 10:11 AM Martha Cunningham discharge to home/self care  Follow-up Information     Follow up With Specialties Details Why Neil Fay, DO Family Medicine In 1 week For wound re-check [facility]  [phone]            Discharge Medication List as of 8/2/2021 10:12 AM      CONTINUE these medications which have NOT CHANGED    Details   albuterol (PROVENTIL HFA,VENTOLIN HFA) 90 mcg/act inhaler Inhale 2 puffs every 4 (four) hours as needed for wheezing, Starting Fri 1/26/2018, Normal      calcium carbonate (OS-PRETTY) 1250 (500 CA) MG tablet Take 1 tablet by mouth daily  , Until Discontinued, Historical Med      famotidine (PEPCID) 20 mg tablet Take 1 tablet (20 mg total) by mouth daily, Starting Fri 7/9/2021, Normal      omeprazole (PriLOSEC) 20 mg delayed release capsule Take 20 mg by mouth as needed, Starting Tue 12/29/2020, Until Wed 12/29/2021 at 2359, Historical Med           No discharge procedures on file      PDMP Review     None          ED Provider  Electronically Signed by           Carlito Diaz PA-C  08/02/21 [address], [place]  08/02/21 6608

## 2021-08-02 NOTE — DISCHARGE INSTRUCTIONS
Please take antibiotics as prescribed  Please see the vaccine schedule below to complete your rabies vaccination  Today he received your 1st dose of the rabies vaccine  You will additionally need to receive 3 more doses  This will be on August 5th, August 9th, August 16th  [facility]  If you have any new or worsening symptoms please return immediately to the emergency department

## 2021-08-02 NOTE — ED ATTENDING ATTESTATION
8/2/2021  ILory MD, saw and evaluated the patient  I have discussed the patient with the resident/non-physician practitioner and agree with the resident's/non-physician practitioner's findings, Plan of Care, and MDM as documented in the resident's/non-physician practitioner's note, except where noted  All available labs and Radiology studies were reviewed  I was present for key portions of any procedure(s) performed by the resident/non-physician practitioner and I was immediately available to provide assistance  At this point I agree with the current assessment done in the Emergency Department        ED Course         Critical Care Time  Procedures

## 2021-08-02 NOTE — Clinical Note
Laura Castro was seen and treated in our emergency department on 8/2/2021  Diagnosis:     Martha    She may return on this date: 08/05/2021         If you have any questions or concerns, please don't hesitate to call        Oleg Rasheed PA-C    ______________________________           _______________          _______________  Hospital Representative                              Date                                Time

## 2021-08-05 ENCOUNTER — CLINICAL SUPPORT (OUTPATIENT)
Dept: URGENT CARE | Facility: CLINIC | Age: 66
End: 2021-08-05
Payer: COMMERCIAL

## 2021-08-05 VITALS — TEMPERATURE: 97.9 F

## 2021-08-05 DIAGNOSIS — Z20.3 EXPOSURE TO RABIES: Primary | ICD-10-CM

## 2021-08-05 PROCEDURE — 90675 RABIES VACCINE IM: CPT

## 2021-08-05 PROCEDURE — 90471 IMMUNIZATION ADMIN: CPT

## 2021-08-06 ENCOUNTER — APPOINTMENT (EMERGENCY)
Dept: RADIOLOGY | Facility: HOSPITAL | Age: 66
End: 2021-08-06
Payer: COMMERCIAL

## 2021-08-06 ENCOUNTER — HOSPITAL ENCOUNTER (EMERGENCY)
Facility: HOSPITAL | Age: 66
Discharge: HOME | End: 2021-08-06
Attending: STUDENT IN AN ORGANIZED HEALTH CARE EDUCATION/TRAINING PROGRAM
Payer: COMMERCIAL

## 2021-08-06 VITALS
OXYGEN SATURATION: 100 % | RESPIRATION RATE: 16 BRPM | DIASTOLIC BLOOD PRESSURE: 71 MMHG | TEMPERATURE: 98.7 F | SYSTOLIC BLOOD PRESSURE: 136 MMHG | HEART RATE: 80 BPM | WEIGHT: 173.28 LBS | BODY MASS INDEX: 26.35 KG/M2

## 2021-08-06 DIAGNOSIS — M25.532 LEFT WRIST PAIN: ICD-10-CM

## 2021-08-06 DIAGNOSIS — M79.89 SWELLING OF LEFT HAND: Primary | ICD-10-CM

## 2021-08-06 PROCEDURE — 99283 EMERGENCY DEPT VISIT LOW MDM: CPT

## 2021-08-06 PROCEDURE — 73110 X-RAY EXAM OF WRIST: CPT

## 2021-08-06 PROCEDURE — 73130 X-RAY EXAM OF HAND: CPT

## 2021-08-06 PROCEDURE — 99284 EMERGENCY DEPT VISIT MOD MDM: CPT | Performed by: STUDENT IN AN ORGANIZED HEALTH CARE EDUCATION/TRAINING PROGRAM

## 2021-08-06 NOTE — Clinical Note
Jen Moser was seen and treated in our emergency department on 8/6/2021  Diagnosis:     Martha    She may return on this date:     Please excuse Jen Moser from work on 8/6 and 8/9  If you have any questions or concerns, please don't hesitate to call        Sabine Sharpe,     ______________________________           _______________          _______________  Hospital Representative                              Date                                Time

## 2021-08-06 NOTE — ED PROVIDER NOTES
History  Chief Complaint   Patient presents with    Wrist Pain     pt states she was seen here for cat bite and swelling on monday and she recieved vaccines and antibiotics  pt states she swelling has increased with pain after accidently knocking it on vanity on wednesday  History provided by:  Patient  Wrist Swelling  Location:  Hand  Hand location:  [place] of L hand  Injury: yes    Time since incident:  2 days  Pain details:     Quality:  Aching    Radiates to:  Does not radiate    Severity:  Mild    Onset quality:  Sudden    Duration:  2 days    Timing:  Intermittent    Progression:  Unchanged  Tetanus status:  Up to date  Prior injury to area:  Yes  Relieved by:  Rest, NSAIDs and acetaminophen  Worsened by: Movement  Associated symptoms: decreased range of motion and stiffness    Associated symptoms: no fever, no muscle weakness, no neck pain, no numbness, no swelling and no tingling       72year old F  Presents to the ED with left hand/wrist swelling  Was bit on the left hand by a stray cat on 8/31  Was evaluated in the ED on 8/2  Prescribed Augmentin and administered her doses of the rabies immunoglobulin and vaccine  Has been compliant with the antibiotic  Received her second dose of the rabies vaccine yesterday  Two days ago, the patient struck her left hand on a vanity  Has been having increased pain, swelling and decreased ROM since then  Has been taking Tylenol/Motrin with relief  Denies fever, chills, increased redness along the left hand/wrist      Prior to Admission Medications   Prescriptions Last Dose Informant Patient Reported? Taking?    albuterol (PROVENTIL HFA,VENTOLIN HFA) 90 mcg/act inhaler   No No   Sig: Inhale 2 puffs every 4 (four) hours as needed for wheezing   Patient not taking: Reported on 8/2/2021   amoxicillin-clavulanate (AUGMENTIN) 875-125 mg per tablet   No No   Sig: Take 1 tablet by mouth every 12 (twelve) hours for 7 days   calcium carbonate (OS-PRETTY) 1250 (500 CA) MG tablet   Yes No   Sig: Take 1 tablet by mouth daily  famotidine (PEPCID) 20 mg tablet   No No   Sig: Take 1 tablet (20 mg total) by mouth daily   Patient not taking: Reported on 2021   omeprazole (PriLOSEC) 20 mg delayed release capsule   Yes No   Sig: Take 20 mg by mouth as needed      Facility-Administered Medications: None       Past Medical History:   Diagnosis Date    History of right breast cancer        Past Surgical History:   Procedure Laterality Date     SECTION      JOINT REPLACEMENT      TOTAL HIP ARTHROPLASTY      US GUIDED BREAST BIOPSY LEFT COMPLETE Left 2016       History reviewed  No pertinent family history  I have reviewed and agree with the history as documented  E-Cigarette/Vaping    E-Cigarette Use Never User      E-Cigarette/Vaping Substances     Social History     Tobacco Use    Smoking status: Never Smoker    Smokeless tobacco: Never Used   Vaping Use    Vaping Use: Never used   Substance Use Topics    Alcohol use: Never    Drug use: Never       Review of Systems   Constitutional: Negative for chills and fever  HENT: Negative for congestion, rhinorrhea, sinus pressure and sinus pain  Eyes: Negative for pain and visual disturbance  Respiratory: Negative for chest tightness and shortness of breath  Cardiovascular: Negative for chest pain and palpitations  Gastrointestinal: Negative for abdominal pain, diarrhea, nausea and vomiting  Genitourinary: Negative for difficulty urinating, dysuria, flank pain, frequency and urgency  Musculoskeletal: Positive for arthralgias, joint swelling and stiffness  Negative for neck pain and neck stiffness  Skin: Positive for wound  Negative for color change  Neurological: Negative for dizziness, weakness, light-headedness and numbness  Hematological: Does not bruise/bleed easily  Psychiatric/Behavioral: Negative for confusion and sleep disturbance     All other systems reviewed and are negative  Physical Exam  Physical Exam  Vitals and nursing note reviewed  Constitutional:       General: She is not in acute distress  Appearance: She is normal weight  She is not ill-appearing or toxic-appearing  HENT:      Head: Normocephalic and atraumatic  Right Ear: External ear normal       Left Ear: External ear normal       Nose: No congestion or rhinorrhea  Mouth/Throat:      Mouth: Mucous membranes are moist       Pharynx: Oropharynx is clear  No oropharyngeal exudate or posterior oropharyngeal erythema  Eyes:      General:         Right eye: No discharge  Left eye: No discharge  Extraocular Movements: Extraocular movements intact  Pupils: Pupils are equal, round, and reactive to light  Cardiovascular:      Rate and Rhythm: Normal rate and regular rhythm  Pulses: Normal pulses  Heart sounds: Normal heart sounds  Pulmonary:      Effort: Pulmonary effort is normal       Breath sounds: Normal breath sounds  No wheezing or rhonchi  Chest:      Chest wall: No tenderness  Abdominal:      General: Abdomen is flat  Palpations: Abdomen is soft  Tenderness: There is no abdominal tenderness  There is no right CVA tenderness, left CVA tenderness, guarding or rebound  Musculoskeletal:         General: Swelling and tenderness present  No deformity  Hands:       Cervical back: Neck supple  No tenderness  Comments: Soft tissue swelling along the proximal dorsal aspect of the left hand  No cellulitis, induration/fluctuance noted  Left upper extremity is neurovascularly intact  Mild tenderness to palpation along the area of swelling  Decreased range of motion of the wrist secondary to pain/swelling  Skin:     General: Skin is warm and dry  Capillary Refill: Capillary refill takes less than 2 seconds  Findings: No bruising, erythema or rash  Neurological:      General: No focal deficit present        Mental Status: She is alert and oriented to person, place, and time  Mental status is at baseline  Cranial Nerves: No cranial nerve deficit  Sensory: No sensory deficit  Motor: No weakness  Psychiatric:         Mood and Affect: Mood normal          Behavior: Behavior normal          Thought Content: Thought content normal          Judgment: Judgment normal        Vital Signs  ED Triage Vitals [08/06/21 0813]   Temperature Pulse Respirations Blood Pressure SpO2   98 7 °F (37 1 °C) 80 16 136/71 100 %      Temp Source Heart Rate Source Patient Position - Orthostatic VS BP Location FiO2 (%)   Temporal Monitor Lying Left arm --      Pain Score       3         Vitals:    08/06/21 0813   BP: 136/71   Pulse: 80   Patient Position - Orthostatic VS: Lying     ED Medications  Medications - No data to display    Diagnostic Studies  Results Reviewed     None             XR wrist 3+ views LEFT   Final Result by Marty Panda MD (08/06 2511)      No acute osseous abnormality  Workstation performed: [id number]         XR hand 3+ views LEFT   Final Result by Marty Panda MD (08/06 1462)      No acute osseous abnormality  Workstation performed: [id number]                Procedures  Procedures     ED Course       SBIRT 20yo+      Most Recent Value   SBIRT (25 yo +)   In order to provide better care to our patients, we are screening all of our patients for alcohol and drug use  Would it be okay to ask you these screening questions? No Filed at: 08/06/2021 6496        [facility]     28-year-old female  Presents to the emergency department with left hand/worse swelling and pain  Was recently evaluated in the emergency department status post cat bite  She received rabies vaccine/immunoglobulin  Has been compliant with oral antibiotics  Denies fever/chills, cellulitic changes of the left hand  Of note, the patient struck her left hand along a vanity 2 days ago    Was having improvement of pain and swelling along the left hand until the recent injury  X-rays obtained negative for signs of fracture, osteomyelitis, foreign bodies  Low concern for worsening infection  The patient was provided with a cock-up splint for comfort  Other supportive care measures and return precautions were discussed with the patient  She expressed understanding  All questions were addressed  The patient was stable for discharge  Disposition  Final diagnoses:   Swelling of left hand   Left wrist pain     Time reflects when diagnosis was documented in both MDM as applicable and the Disposition within this note     Time User Action Codes Description Comment    8/6/2021  8:59 AM Jane Ducking Add [M79 89] Swelling of left hand     8/6/2021  8:59 AM Jane Ducking Add [M25 532] Left wrist pain       ED Disposition     ED Disposition Condition Date/Time Comment    Discharge Stable Fri Aug 6, 2021  8:58 AM Martha Cunningham discharge to home/self care  Follow-up Information     Follow up With Specialties Details Why Contact Karla Landaverde DO Family Medicine In 1 week  [address]  [facility]  [phone]            Discharge Medication List as of 8/6/2021  9:00 AM      CONTINUE these medications which have NOT CHANGED    Details   albuterol (PROVENTIL HFA,VENTOLIN HFA) 90 mcg/act inhaler Inhale 2 puffs every 4 (four) hours as needed for wheezing, Starting Fri 1/26/2018, Normal      amoxicillin-clavulanate (AUGMENTIN) 875-125 mg per tablet Take 1 tablet by mouth every 12 (twelve) hours for 7 days, Starting Mon 8/2/2021, Until Mon 8/9/2021, Normal      calcium carbonate (OS-PRETTY) 1250 (500 CA) MG tablet Take 1 tablet by mouth daily  , Until Discontinued, Historical Med      famotidine (PEPCID) 20 mg tablet Take 1 tablet (20 mg total) by mouth daily, Starting Fri 7/9/2021, Normal      omeprazole (PriLOSEC) 20 mg delayed release capsule Take 20 mg by mouth as needed, Starting Tue 12/29/2020, Until Wed 12/29/2021 at 2359, Historical Med           No discharge procedures on file      PDMP Review     None          ED Provider  Electronically Signed by           Felipe Rojas DO  08/06/21 7419

## 2021-08-06 NOTE — DISCHARGE INSTRUCTIONS
You were evaluated in the emergency department for left wrist/hand pain and swelling  The x-rays that were obtained not show any significant abnormalities  Continue taking your antibiotics and follow-up for your next rabies vaccine  For pain, you can take ibuprofen 600 mg every 6 hours and/or Tylenol 1000 mg every 6 hours  You can also apply ice  20 minutes on, 20 minutes off  Do not hesitate to return to the emergency department for any concerning signs or symptoms

## 2021-08-09 ENCOUNTER — CLINICAL SUPPORT (OUTPATIENT)
Dept: URGENT CARE | Facility: CLINIC | Age: 66
End: 2021-08-09
Payer: COMMERCIAL

## 2021-08-09 DIAGNOSIS — Z23 NEED FOR RABIES VACCINATION: Primary | ICD-10-CM

## 2021-08-09 PROCEDURE — 90675 RABIES VACCINE IM: CPT

## 2021-08-16 ENCOUNTER — CLINICAL SUPPORT (OUTPATIENT)
Dept: URGENT CARE | Facility: CLINIC | Age: 66
End: 2021-08-16
Payer: COMMERCIAL

## 2021-08-16 DIAGNOSIS — Z23 NEED FOR IMMUNIZATION AGAINST RABIES: Primary | ICD-10-CM

## 2021-08-16 PROCEDURE — 90675 RABIES VACCINE IM: CPT

## 2021-08-16 PROCEDURE — 90471 IMMUNIZATION ADMIN: CPT

## 2021-11-15 ENCOUNTER — OFFICE VISIT (OUTPATIENT)
Dept: CARDIOLOGY CLINIC | Facility: CLINIC | Age: 66
End: 2021-11-15
Payer: COMMERCIAL

## 2021-11-15 VITALS
HEIGHT: 68 IN | BODY MASS INDEX: 26.52 KG/M2 | DIASTOLIC BLOOD PRESSURE: 82 MMHG | WEIGHT: 175 LBS | HEART RATE: 72 BPM | SYSTOLIC BLOOD PRESSURE: 124 MMHG

## 2021-11-15 DIAGNOSIS — R55 SYNCOPE AND COLLAPSE: Primary | ICD-10-CM

## 2021-11-15 DIAGNOSIS — Z82.49 FAMILY HISTORY OF CORONARY ARTERY DISEASE: ICD-10-CM

## 2021-11-15 DIAGNOSIS — I77.810 AORTIC ECTASIA, THORACIC (HCC): ICD-10-CM

## 2021-11-15 DIAGNOSIS — R01.1 MURMUR: ICD-10-CM

## 2021-11-15 DIAGNOSIS — K21.9 GASTROESOPHAGEAL REFLUX DISEASE, UNSPECIFIED WHETHER ESOPHAGITIS PRESENT: ICD-10-CM

## 2021-11-15 PROCEDURE — 99244 OFF/OP CNSLTJ NEW/EST MOD 40: CPT | Performed by: INTERNAL MEDICINE

## 2021-11-15 RX ORDER — MULTIVITAMIN
1 TABLET ORAL DAILY
COMMUNITY

## 2021-11-18 PROBLEM — Z82.49 FAMILY HISTORY OF CORONARY ARTERY DISEASE: Status: ACTIVE | Noted: 2021-11-18

## 2021-11-18 PROCEDURE — 93000 ELECTROCARDIOGRAM COMPLETE: CPT | Performed by: INTERNAL MEDICINE

## 2021-12-14 ENCOUNTER — HOSPITAL ENCOUNTER (OUTPATIENT)
Dept: NON INVASIVE DIAGNOSTICS | Facility: HOSPITAL | Age: 66
Discharge: HOME | End: 2021-12-14
Attending: INTERNAL MEDICINE
Payer: COMMERCIAL

## 2021-12-14 DIAGNOSIS — R55 SYNCOPE AND COLLAPSE: ICD-10-CM

## 2021-12-14 DIAGNOSIS — I77.810 AORTIC ECTASIA, THORACIC (HCC): ICD-10-CM

## 2021-12-14 DIAGNOSIS — R01.1 MURMUR: ICD-10-CM

## 2021-12-14 LAB
MAX HR PERCENT: 99 %
MAX HR: 154 BPM
RATE PRESSURE PRODUCT: NORMAL
SL CV STRESS RECOVERY BP: NORMAL MMHG
SL CV STRESS RECOVERY HR: 91 BPM
SL CV STRESS RECOVERY O2 SAT: 98 %
SL CV STRESS STAGE REACHED: 3
STRESS ANGINA INDEX: 0
STRESS BASELINE BP: NORMAL MMHG
STRESS BASELINE HR: 77 BPM
STRESS O2 SAT REST: 99 %
STRESS PEAK HR: 153 BPM
STRESS PERCENT HR: 99 %
STRESS POST ESTIMATED WORKLOAD: 10.1 METS
STRESS POST EXERCISE DUR MIN: 9 MIN
STRESS POST EXERCISE DUR SEC: 1 SEC
STRESS POST O2 SAT PEAK: 99 %
STRESS POST PEAK BP: 160 MMHG
STRESS TARGET HR: 153 BPM

## 2021-12-14 PROCEDURE — 93017 CV STRESS TEST TRACING ONLY: CPT

## 2021-12-15 ENCOUNTER — TELEPHONE (OUTPATIENT)
Dept: CARDIOLOGY CLINIC | Facility: CLINIC | Age: 66
End: 2021-12-15

## 2021-12-30 LAB
ARRHY DURING EX: NORMAL
CHEST PAIN STATEMENT: NORMAL
MAX DIASTOLIC BP: 78 MMHG
MAX HEART RATE: 153 BPM
MAX PREDICTED HEART RATE: 154 BPM
MAX. SYSTOLIC BP: 160 MMHG
PROTOCOL NAME: NORMAL
REASON FOR TERMINATION: NORMAL
TARGET HR FORMULA: NORMAL
TEST INDICATION: NORMAL
TIME IN EXERCISE PHASE: NORMAL

## 2022-02-15 ENCOUNTER — HOSPITAL ENCOUNTER (OUTPATIENT)
Dept: NON INVASIVE DIAGNOSTICS | Facility: HOSPITAL | Age: 67
Discharge: HOME | End: 2022-02-15
Attending: INTERNAL MEDICINE
Payer: COMMERCIAL

## 2022-02-15 VITALS
DIASTOLIC BLOOD PRESSURE: 82 MMHG | HEIGHT: 68 IN | WEIGHT: 175 LBS | SYSTOLIC BLOOD PRESSURE: 124 MMHG | HEART RATE: 70 BPM | BODY MASS INDEX: 26.52 KG/M2

## 2022-02-15 DIAGNOSIS — R55 SYNCOPE AND COLLAPSE: ICD-10-CM

## 2022-02-15 DIAGNOSIS — R01.1 MURMUR: ICD-10-CM

## 2022-02-15 DIAGNOSIS — I77.810 AORTIC ECTASIA, THORACIC (HCC): ICD-10-CM

## 2022-02-15 LAB
AORTIC VALVE ANNULUS: 3 CM (ref 1.69–2.47)
APICAL FOUR CHAMBER EJECTION FRACTION: 67 %
ASCENDING AORTA: 4 CM (ref 2.01–3.01)
AV PEAK GRADIENT: 9 MMHG
E WAVE DECELERATION TIME: 222 MS
FRACTIONAL SHORTENING: 38 % (ref 28–44)
INTERVENTRICULAR SEPTUM IN DIASTOLE (PARASTERNAL SHORT AXIS VIEW): 0.9 CM (ref 0.53–0.99)
INTERVENTRICULAR SEPTUM: 0.9 CM (ref 0.6–1.1)
LAAS-AP4: 15 CM2
LEFT ATRIUM SIZE: 3.4 CM
LEFT INTERNAL DIMENSION IN SYSTOLE: 2.4 CM (ref 2.1–4)
LEFT VENTRICULAR INTERNAL DIMENSION IN DIASTOLE: 3.9 CM (ref 4.64–6.91)
LEFT VENTRICULAR POSTERIOR WALL IN END DIASTOLE: 1.2 CM (ref 0.52–0.98)
LEFT VENTRICULAR STROKE VOLUME: 44 ML
LVSV (TEICH): 44 ML
MV E'TISSUE VEL-LAT: 12 CM/S
MV E'TISSUE VEL-SEP: 14 CM/S
MV PEAK A VEL: 0.97 M/S
MV PEAK E VEL: 93 CM/S
RA PRESSURE ESTIMATED: 5 MMHG
RIGHT ATRIAL 2D VOLUME: 19 ML
RIGHT ATRIUM AREA SYSTOLE A4C: 9.8 CM2
RIGHT VENTRICLE ID DIMENSION: 2.6 CM
RV PSP: 23 MMHG
SINUS: 3 CM
SL CV LV EF: 60
SL CV PED ECHO LEFT VENTRICLE DIASTOLIC VOLUME (MOD BIPLANE) 2D: 64 ML
SL CV PED ECHO LEFT VENTRICLE SYSTOLIC VOLUME (MOD BIPLANE) 2D: 20 ML
TR MAX PG: 18 MMHG
TR PEAK VELOCITY: 2.1 M/S
TRICUSPID ANNULAR PLANE SYSTOLIC EXCURSION: 1.9 CM
TRICUSPID VALVE PEAK REGURGITATION VELOCITY: 2.14 M/S
Z-SCORE OF AORTIC VALVE ANNULUS: 4.7
Z-SCORE OF ASCENDING AORTA: 5.89
Z-SCORE OF INTERVENTRICULAR SEPTUM IN END DIASTOLE: 1.18
Z-SCORE OF LEFT VENTRICULAR DIMENSION IN END SYSTOLE: -3.73
Z-SCORE OF LEFT VENTRICULAR POSTERIOR WALL IN END DIASTOLE: 3.83

## 2022-02-15 PROCEDURE — 93306 TTE W/DOPPLER COMPLETE: CPT | Performed by: STUDENT IN AN ORGANIZED HEALTH CARE EDUCATION/TRAINING PROGRAM

## 2022-02-15 PROCEDURE — 93306 TTE W/DOPPLER COMPLETE: CPT

## 2022-02-16 ENCOUNTER — TELEPHONE (OUTPATIENT)
Dept: CARDIOLOGY CLINIC | Facility: CLINIC | Age: 67
End: 2022-02-16

## 2022-02-16 NOTE — TELEPHONE ENCOUNTER
----- Message from Santiago Scherer sent at 2/15/2022  5:37 PM EST -----  Please let patient know that her echo showed normal heart function with no significant valve issues  Her ascending aorta dilation seen on CTA imaging in July 2021 is unchanged  That should be followed yearly

## 2022-05-16 ENCOUNTER — OFFICE VISIT (OUTPATIENT)
Dept: CARDIOLOGY CLINIC | Facility: CLINIC | Age: 67
End: 2022-05-16
Payer: COMMERCIAL

## 2022-05-16 VITALS
WEIGHT: 180 LBS | BODY MASS INDEX: 27.28 KG/M2 | HEIGHT: 68 IN | HEART RATE: 90 BPM | SYSTOLIC BLOOD PRESSURE: 120 MMHG | DIASTOLIC BLOOD PRESSURE: 70 MMHG

## 2022-05-16 DIAGNOSIS — R01.1 MURMUR: ICD-10-CM

## 2022-05-16 DIAGNOSIS — I77.810 AORTIC ECTASIA, THORACIC (HCC): ICD-10-CM

## 2022-05-16 DIAGNOSIS — K21.9 GASTROESOPHAGEAL REFLUX DISEASE, UNSPECIFIED WHETHER ESOPHAGITIS PRESENT: ICD-10-CM

## 2022-05-16 DIAGNOSIS — Z82.49 FAMILY HISTORY OF CORONARY ARTERY DISEASE: ICD-10-CM

## 2022-05-16 DIAGNOSIS — R55 SYNCOPE AND COLLAPSE: Primary | ICD-10-CM

## 2022-05-16 PROCEDURE — 99214 OFFICE O/P EST MOD 30 MIN: CPT | Performed by: INTERNAL MEDICINE

## 2022-05-16 NOTE — PATIENT INSTRUCTIONS
Updated imaging of the aorta with MRI/MRA as discussed   Work on some dietary modification for cholesterol  Call the office if you have one of your 'dopey' spells and we can do a quick ECG and blood pressure check

## 2022-05-16 NOTE — ASSESSMENT & PLAN NOTE
There is mild ectasia of ascending thoracic aorta measuring 3 8 cm in diameter on CT scan 7/9/2021  Echocardiogram 02/15/2022 showed mildly dilated ascending aorta measuring 4 cm  I have recommended MRA of the aorta to rule out aneurysm (reduce radiation exposure)

## 2022-05-16 NOTE — ASSESSMENT & PLAN NOTE
Family history of MI in father who  at age 76 and in her brother who  at age 61 of MI  Recommend aggressive risk factor modification  Goal LDL < 100  Most recent lipid panel reviewed from 2022 showed an LDL of 133  Lipid panel 2022: C 217  T 88  H 66  L 133  Discussed aggressive dietary modification and if LDL remains elevated will start statin therapy

## 2022-05-16 NOTE — PROGRESS NOTES
Cardiology Office Visit    Ingris Thrasher  [id number]  1955    [facility]  [address] [place] [facility] [address] [phone]      Dear Mary Carmen Willis DO,    I had the pleasure of seeing your patient at our [address] [facility] office today 5/16/2022  As you know she is a pleasant 77y o  year old female with a medical history as described below  Reason for office visit: Follow up syncope  Family history of premature coronary artery disease  1  Syncope and collapse  Assessment & Plan:  Suspect vasovagal syncope in the setting of a painful/broken nail at nail salon  Prior history of near syncope as a child when standing too long  ECG 11/15/2021 unremarkable  Echocardiogram 02/15/2022 was unremarkable with the exception of a mildly dilated ascending aorta measuring 4 cm  There is no significant valvular disease and heart function was normal  Exercise stress test 12/14/2021 was normal with good functional capacity  Can consider Tilt Table test if recurrent episodes  Discussed the need to stay adequately hydrated  No recurrent episodes of syncope reports episodes of feeling dopey at which time she has difficulty concentrating and feels off balance  It is unclear what these episodes represent although she terms this vertigo I discussed that this is not completely typical of vertigo as patient is typically have significant dizziness which she adamantly denies  When she has these episodes they typically last for a day or so  Have asked her to call the office if she has recurrent episode 7 can bring her in for an EKG and blood pressure check that time  If she continues to have recurrent symptoms we could consider implantable loop recorder  2  Aortic ectasia, thoracic (HCC)  Assessment & Plan:  There is mild ectasia of ascending thoracic aorta measuring 3 8 cm in diameter on CT scan 7/9/2021    Echocardiogram 02/15/2022 showed mildly dilated ascending aorta measuring 4 cm  I have recommended MRA of the aorta to rule out aneurysm (reduce radiation exposure)  Orders:  -     MRI angiogram chest with and without contrast; Future; Expected date: 2022    3  Murmur  Assessment & Plan:  Systolic ejection murmur noted on exam   Echocardiogram 2/15/2022 showed aortic sclerosis without stenosis  I suspect this is the source of her murmur  4  Family history of coronary artery disease  Assessment & Plan:  Family history of MI in father who  at age 76 and in her brother who  at age 61 of MI  Recommend aggressive risk factor modification  Goal LDL < 100  Most recent lipid panel reviewed from 2022 showed an LDL of 133  Lipid panel 2022: C 217  T 88  H 66  L 133  Discussed aggressive dietary modification and if LDL remains elevated will start statin therapy  5  Gastroesophageal reflux disease, unspecified whether esophagitis present  Assessment & Plan:  Improved with PPI  HPI   Patient has a history of breast cancer as well as GERD  Patient was at a nail salon and was noted to have a painful broken finger nail and passed out  11/15/2021: Patient tells me that she was at the nail salon and noted her nail seemed very painful and was then told that her nail was cracked and passed out  She does have a history of prior near syncope as a child  She tells me that if she stood for long periods of time she would get very lightheaded  She does report episodes of feeling 'unclear' in her head at times which can last from 2-5 days  She also describes 'spinning' episodes  She did have a prior stress echocardiogram 2016  She denies any shortness of breath at baseline  She does report some discomfort under her sternum that radiates into her back at times  She does report dyspnea on exertion with certain activities  Energy levels are ok  ECG today is unremarkable   Lipids reviewed from 4/7/2020  Patient was noted to have mild aortic ectasia of the thoracic aorta measuring 3 8 cm in diameter on CT scan done 7/9/2021  She does report a 16 pound weight gain over the last year  5/16/2022:  Patient returns to the office today for follow-up  She tells me that she has been having episodes of vertigo and has undergone MRI of her head  Upon further discussion describes episodes of feeling dopey which includes feeling off balance and some spacey but she adamantly denies any overt dizziness  She tells me that her last episode occurred on Monday when she woke up and felt very off balance and described having to concentrate very hard to do things  Her last episode prior to that occurred 1/2022  We discussed that this does not sound like classic vertigo  She continues to drink a large amount of diet Pepsi  She typically finishes the 2 L bottle prior to 11:00 a m  in the morning      Patient Active Problem List   Diagnosis    Aortic ectasia, thoracic (HCC)    Syncope and collapse    GERD (gastroesophageal reflux disease)    Murmur    Family history of coronary artery disease    Mixed hyperlipidemia     Past Medical History:   Diagnosis Date    GERD (gastroesophageal reflux disease)     History of right breast cancer      Social History     Socioeconomic History    Marital status: /Civil Union     Spouse name: Not on file    Number of children: 1    Years of education: Not on file    Highest education level: Not on file   Occupational History    Occupation: Drives [employer]   Tobacco Use    Smoking status: Never Smoker    Smokeless tobacco: Never Used   Vaping Use    Vaping Use: Never used   Substance and Sexual Activity    Alcohol use: Not Currently    Drug use: Never    Sexual activity: Not on file     Comment: Defer   Other Topics Concern    Not on file   Social History Narrative    Not on file     Social Determinants of Health     Financial Resource Strain: Not on file   Food Insecurity: Not on file   Transportation Needs: Not on file   Physical Activity: Not on file   Stress: Not on file   Social Connections: Not on file   Intimate Partner Violence: Not on file   Housing Stability: Not on file      Family History   Problem Relation Age of Onset    Cancer Mother          at age 77    Heart attack Father          at age 76   Eliot Grace No Known Problems Sister     Heart attack Brother          at age 61   Eliot Grace No Known Problems Brother      Past Surgical History:   Procedure Laterality Date    BREAST LUMPECTOMY       SECTION      TOTAL HIP ARTHROPLASTY Bilateral     US GUIDED BREAST BIOPSY LEFT COMPLETE Left 2016       Current Outpatient Medications:     calcium carbonate (OS-PRETTY) 1250 (500 CA) MG tablet, Take 1 tablet by mouth daily  , Disp: , Rfl:     Multiple Vitamin (multivitamin) tablet, Take 1 tablet by mouth daily, Disp: , Rfl:     omeprazole (PriLOSEC) 20 mg delayed release capsule, Take 20 mg by mouth as needed, Disp: , Rfl:   No Known Allergies      Cardiac Test Results:    Lipid panel 2022: C 217  T 88  H 66  L 133  Exercise stress test 2021:  Deandre protocol  9:01  10 1 METs  No ECG evidence of ischemia    ECG 11/15/2021: Normal sinus rhythm  Normal ECG  CTA chest 2021:   There is mild ectasia of ascending thoracic aorta measuring 3 8 cm in diameter  Echocardiogram 2/15/2022:  EF 60%  Mildly thickened aortic valve leaflets with normal mobility  No evidence of aortic stenosis  Trace mitral regurgitation  Mildly dilated ascending aorta at 4 cm  Recommended additional imaging of aorta with MRA or CTA  Stress echocardiogram 2016:   Deandre protocol  9:00  10 1 METs  Negative ECG for ischemia  Negative exercise stress echocardiography for ischemia  Lipid panel 2020: C 198  T 262  H 59  L 87       Review of Systems:    Review of Systems   Constitutional: Positive for unexpected weight change (weight increased)  Negative for activity change, appetite change and fatigue  HENT: Negative for congestion, hearing loss, tinnitus and trouble swallowing  Eyes: Negative for visual disturbance  Respiratory: Negative for cough, chest tightness, shortness of breath and wheezing  Dyspnea   Cardiovascular: Negative for chest pain, palpitations and leg swelling  Gastrointestinal: Negative for abdominal distention, abdominal pain, nausea and vomiting  Genitourinary: Negative for difficulty urinating  Musculoskeletal: Negative for arthralgias  Skin: Negative for rash  Neurological: Positive for syncope (none recent) and light-headedness (feels 'dopey' with need to concentrate to do things  )  Negative for dizziness  Hematological: Does not bruise/bleed easily  Psychiatric/Behavioral: Negative for confusion  The patient is not nervous/anxious  All other systems reviewed and are negative  Vitals:    05/16/22 1516 05/16/22 1601   BP: 138/72 120/70   Pulse: 90    Weight: 81 6 kg (180 lb)    Height: 5' 8" (1 727 m)      Vitals:    05/16/22 1516   Weight: 81 6 kg (180 lb)     Height: 5' 8" (172 7 cm)     Physical Exam  Vitals reviewed  Constitutional:       Appearance: She is well-developed  HENT:      Head: Normocephalic and atraumatic  Eyes:      Conjunctiva/sclera: Conjunctivae normal       Pupils: Pupils are equal, round, and reactive to light  Neck:      Vascular: No JVD  Cardiovascular:      Rate and Rhythm: Normal rate and regular rhythm  Heart sounds: Murmur heard  No friction rub  No gallop  Pulmonary:      Effort: Pulmonary effort is normal       Breath sounds: Normal breath sounds  Abdominal:      General: Bowel sounds are normal       Palpations: Abdomen is soft  Musculoskeletal:      Cervical back: Normal range of motion  Skin:     General: Skin is warm and dry  Neurological:      Mental Status: She is alert and oriented to person, place, and time  Psychiatric:         Behavior: Behavior normal

## 2022-05-16 NOTE — ASSESSMENT & PLAN NOTE
Suspect vasovagal syncope in the setting of a painful/broken nail at nail salon  Prior history of near syncope as a child when standing too long  ECG 11/15/2021 unremarkable  Echocardiogram 02/15/2022 was unremarkable with the exception of a mildly dilated ascending aorta measuring 4 cm  There is no significant valvular disease and heart function was normal  Exercise stress test 12/14/2021 was normal with good functional capacity  Can consider Tilt Table test if recurrent episodes  Discussed the need to stay adequately hydrated  No recurrent episodes of syncope reports episodes of feeling dopey at which time she has difficulty concentrating and feels off balance  It is unclear what these episodes represent although she terms this vertigo I discussed that this is not completely typical of vertigo as patient is typically have significant dizziness which she adamantly denies  When she has these episodes they typically last for a day or so  Have asked her to call the office if she has recurrent episode 7 can bring her in for an EKG and blood pressure check that time  If she continues to have recurrent symptoms we could consider implantable loop recorder

## 2022-05-17 PROBLEM — E78.2 MIXED HYPERLIPIDEMIA: Status: ACTIVE | Noted: 2022-05-17

## 2022-05-17 NOTE — ASSESSMENT & PLAN NOTE
Systolic ejection murmur noted on exam   Echocardiogram 2/15/2022 showed aortic sclerosis without stenosis  I suspect this is the source of her murmur

## 2022-05-17 NOTE — ASSESSMENT & PLAN NOTE
Lipid panel 5/14/2022: C 217  T 88  H 66  L 133  We discussed that her goal LDL should ideally be less than 100 given her family history  She has room in her diet for significant improvement  I have recommended aggressive dietary modification and will plan repeat lipid panel and if LDL is > 100 will add statin

## 2022-05-25 ENCOUNTER — HOSPITAL ENCOUNTER (OUTPATIENT)
Dept: MRI IMAGING | Facility: HOSPITAL | Age: 67
Discharge: HOME | End: 2022-05-25
Attending: INTERNAL MEDICINE
Payer: COMMERCIAL

## 2022-05-25 DIAGNOSIS — I77.810 AORTIC ECTASIA, THORACIC (HCC): ICD-10-CM

## 2022-05-25 PROCEDURE — A9585 GADOBUTROL INJECTION: HCPCS | Performed by: INTERNAL MEDICINE

## 2022-05-25 PROCEDURE — 71555 MRI ANGIO CHEST W OR W/O DYE: CPT

## 2022-05-25 PROCEDURE — C8911 MRA W/O FOL W/CONT, CHEST: HCPCS

## 2022-05-25 PROCEDURE — G1004 CDSM NDSC: HCPCS

## 2022-05-25 RX ADMIN — GADOBUTROL 7 ML: 604.72 INJECTION INTRAVENOUS at 12:21

## 2022-05-31 ENCOUNTER — TELEPHONE (OUTPATIENT)
Dept: CARDIOLOGY CLINIC | Facility: CLINIC | Age: 67
End: 2022-05-31

## 2022-05-31 NOTE — TELEPHONE ENCOUNTER
----- Message from Santiago Scherer sent at 5/28/2022  7:32 AM EDT -----  Please let patient know that her MRA did not demonstrate an aneurysm or concerning finding  Thank you!

## 2022-10-03 ENCOUNTER — HOSPITAL ENCOUNTER (EMERGENCY)
Facility: HOSPITAL | Age: 67
Discharge: HOME | End: 2022-10-03
Attending: STUDENT IN AN ORGANIZED HEALTH CARE EDUCATION/TRAINING PROGRAM
Payer: COMMERCIAL

## 2022-10-03 ENCOUNTER — TELEPHONE (OUTPATIENT)
Dept: EMERGENCY DEPT | Facility: HOSPITAL | Age: 67
End: 2022-10-03

## 2022-10-03 VITALS
SYSTOLIC BLOOD PRESSURE: 160 MMHG | HEART RATE: 93 BPM | OXYGEN SATURATION: 98 % | BODY MASS INDEX: 26.07 KG/M2 | DIASTOLIC BLOOD PRESSURE: 94 MMHG | WEIGHT: 172 LBS | HEIGHT: 68 IN | RESPIRATION RATE: 16 BRPM | TEMPERATURE: 100.6 F

## 2022-10-03 DIAGNOSIS — U07.1 COVID-19: ICD-10-CM

## 2022-10-03 DIAGNOSIS — R50.9 FEVER: Primary | ICD-10-CM

## 2022-10-03 LAB
FLUAV RNA RESP QL NAA+PROBE: NEGATIVE
FLUBV RNA RESP QL NAA+PROBE: NEGATIVE
RSV RNA RESP QL NAA+PROBE: NEGATIVE
SARS-COV-2 RNA RESP QL NAA+PROBE: POSITIVE

## 2022-10-03 PROCEDURE — 99284 EMERGENCY DEPT VISIT MOD MDM: CPT | Performed by: STUDENT IN AN ORGANIZED HEALTH CARE EDUCATION/TRAINING PROGRAM

## 2022-10-03 PROCEDURE — 0241U HB NFCT DS VIR RESP RNA 4 TRGT: CPT | Performed by: STUDENT IN AN ORGANIZED HEALTH CARE EDUCATION/TRAINING PROGRAM

## 2022-10-03 PROCEDURE — 99283 EMERGENCY DEPT VISIT LOW MDM: CPT

## 2022-10-03 RX ORDER — IBUPROFEN 600 MG/1
600 TABLET ORAL ONCE
Status: COMPLETED | OUTPATIENT
Start: 2022-10-03 | End: 2022-10-03

## 2022-10-03 RX ORDER — ACETAMINOPHEN 325 MG/1
975 TABLET ORAL ONCE
Status: COMPLETED | OUTPATIENT
Start: 2022-10-03 | End: 2022-10-03

## 2022-10-03 RX ORDER — NIRMATRELVIR AND RITONAVIR 300-100 MG
3 KIT ORAL 2 TIMES DAILY
Qty: 30 TABLET | Refills: 0 | Status: SHIPPED | OUTPATIENT
Start: 2022-10-03 | End: 2022-10-08

## 2022-10-03 RX ADMIN — IBUPROFEN 600 MG: 600 TABLET ORAL at 19:22

## 2022-10-03 RX ADMIN — ACETAMINOPHEN 975 MG: 325 TABLET ORAL at 19:22

## 2022-10-03 NOTE — DISCHARGE INSTRUCTIONS
You will be notified with results of your respiratory viral panel  For fever/body aches, you can take Motrin 600 mg every 6 hours and Tylenol 1000 mg every 6 hours  Drink plenty of fluids over the next few days  You can use over-the-counter medications as needed for nasal congestion/sore throat/runny nose/headache  Do not hesitate to be re-evaluated in the ED for any concerning signs or symptoms

## 2022-10-03 NOTE — ED PROVIDER NOTES
History  Chief Complaint   Patient presents with    Fever - 9 weeks to 74 years     Fever, nausea, sore throat, arthralgia beginning yesterday  History provided by:  Patient  Fever - 9 weeks to 74 years  Max temp prior to arrival:  103 4 F  Temp source:  Oral  Severity:  Moderate  Onset quality:  Gradual  Duration:  1 day  Timing:  Constant  Progression:  Waxing and waning  Chronicity:  New  Relieved by:  Nothing  Worsened by:  Nothing  Ineffective treatments:  Acetaminophen  Associated symptoms: chills, cough and myalgias    Associated symptoms: no chest pain, no congestion, no diarrhea, no dysuria, no headaches, no nausea, no rash, no rhinorrhea, no sore throat and no vomiting       79year old F  Presents to the ED with diffuse body aches, fevers, arthralgias  T max 103 4 F  Denies nasal congestion/rhinorrhea/sore throat  Expresses mild cough  Denies chest pain, shortness of breath, nausea/vomiting/diarrhea  Denies recent sick contacts  Did not receive the COVID vaccines  Has been taking NyQuil, Tylenol  Denies urinary frequency/urgency/burning  Prior to Admission Medications   Prescriptions Last Dose Informant Patient Reported? Taking? Multiple Vitamin (multivitamin) tablet   Yes No   Sig: Take 1 tablet by mouth daily   calcium carbonate (OS-PRETTY) 1250 (500 CA) MG tablet   Yes No   Sig: Take 1 tablet by mouth daily     omeprazole (PriLOSEC) 20 mg delayed release capsule   Yes No   Sig: Take 20 mg by mouth as needed      Facility-Administered Medications: None       Past Medical History:   Diagnosis Date    GERD (gastroesophageal reflux disease)     History of right breast cancer        Past Surgical History:   Procedure Laterality Date    BREAST LUMPECTOMY       SECTION      TOTAL HIP ARTHROPLASTY Bilateral     US GUIDED BREAST BIOPSY LEFT COMPLETE Left 2016       Family History   Problem Relation Age of Onset    Cancer Mother          at age 75    Heart attack Father  at age 76    No Known Problems Sister     Heart attack Brother          at age 61    No Known Problems Brother      I have reviewed and agree with the history as documented  E-Cigarette/Vaping    E-Cigarette Use Never User      E-Cigarette/Vaping Substances     Social History     Tobacco Use    Smoking status: Never Smoker    Smokeless tobacco: Never Used   Vaping Use    Vaping Use: Never used   Substance Use Topics    Alcohol use: Not Currently    Drug use: Never     Review of Systems   Constitutional: Positive for chills and fever  Negative for activity change, appetite change and fatigue  HENT: Negative for congestion, rhinorrhea, sinus pressure, sinus pain and sore throat  Eyes: Negative for pain, discharge, redness and visual disturbance  Respiratory: Positive for cough  Negative for chest tightness, shortness of breath and wheezing  Cardiovascular: Negative for chest pain and palpitations  Gastrointestinal: Negative for abdominal pain, diarrhea, nausea and vomiting  Genitourinary: Negative for decreased urine volume, difficulty urinating, dysuria, flank pain, frequency, pelvic pain and urgency  Musculoskeletal: Positive for arthralgias and myalgias  Negative for neck pain and neck stiffness  Skin: Negative for color change, pallor, rash and wound  Neurological: Negative for dizziness, syncope, weakness, light-headedness and headaches  Hematological: Does not bruise/bleed easily  All other systems reviewed and are negative  Physical Exam  Physical Exam  Vitals and nursing note reviewed  Constitutional:       General: She is not in acute distress  Appearance: She is not ill-appearing or toxic-appearing  HENT:      Head: Normocephalic and atraumatic  Right Ear: Tympanic membrane, ear canal and external ear normal  There is no impacted cerumen  Left Ear: Tympanic membrane, ear canal and external ear normal  There is no impacted cerumen        Nose: Nose normal  No congestion or rhinorrhea  Mouth/Throat:      Mouth: Mucous membranes are moist       Pharynx: Oropharynx is clear  No oropharyngeal exudate or posterior oropharyngeal erythema  Eyes:      General: No scleral icterus  Right eye: No discharge  Left eye: No discharge  Extraocular Movements: Extraocular movements intact  Conjunctiva/sclera: Conjunctivae normal       Pupils: Pupils are equal, round, and reactive to light  Cardiovascular:      Rate and Rhythm: Normal rate and regular rhythm  Pulses: Normal pulses  Heart sounds: Normal heart sounds  No murmur heard  Pulmonary:      Effort: Pulmonary effort is normal  No respiratory distress  Breath sounds: Normal breath sounds  No stridor  No wheezing, rhonchi or rales  Chest:      Chest wall: No tenderness  Abdominal:      General: Abdomen is flat  Bowel sounds are normal       Palpations: Abdomen is soft  Tenderness: There is no abdominal tenderness  There is no right CVA tenderness, left CVA tenderness, guarding or rebound  Musculoskeletal:         General: No swelling or tenderness  Cervical back: Neck supple  No tenderness  Skin:     General: Skin is warm and dry  Capillary Refill: Capillary refill takes less than 2 seconds  Coloration: Skin is not jaundiced or pale  Findings: No bruising, erythema, lesion or rash  Neurological:      General: No focal deficit present  Mental Status: She is alert and oriented to person, place, and time  Mental status is at baseline  Cranial Nerves: No cranial nerve deficit  Sensory: No sensory deficit  Motor: No weakness     Psychiatric:         Mood and Affect: Mood normal          Behavior: Behavior normal        Vital Signs  ED Triage Vitals [10/03/22 1901]   Temperature Pulse Respirations Blood Pressure SpO2   (!) 100 6 °F (38 1 °C) 93 16 160/94 98 %      Temp Source Heart Rate Source Patient Position - Orthostatic VS BP Location FiO2 (%)   Oral -- Lying Right arm --      Pain Score       5           Vitals:    10/03/22 1901   BP: 160/94   Pulse: 93   Patient Position - Orthostatic VS: Lying     ED Medications  Medications   ibuprofen (MOTRIN) tablet 600 mg (600 mg Oral Given 10/3/22 1922)   acetaminophen (TYLENOL) tablet 975 mg (975 mg Oral Given 10/3/22 1922)     Diagnostic Studies  Results Reviewed     Procedure Component Value Units Date/Time    FLU/RSV/COVID - if FLU/RSV clinically relevant [[id number]] Collected: 10/03/22 1923    Lab Status: In process Specimen: Nares from Nose Updated: 10/03/22 1925             No orders to display          Procedures  Procedures     ED Course  ED Course as of 10/03/22 2004   Mon Oct 03, 2022   2003 History and clinical findings most likely consistent with a viral illness  The patient works as a  and may have been exposed to a viral illness  The patient has been eating/drinking well  Denies shortness of breath  SpO2 greater than 95% on room air throughout the course of treatment in the ED  Nonlabored breathing  Febrile upon arrival   Motrin/Tylenol administered  No significant physical exam findings  Respiratory viral panel is pending  The patient will be notified with the results  Conservative measures and return precautions were discussed with the patient  She expressed understanding  All questions were addressed  The patient was stable for discharge  MDM    Disposition  Final diagnoses:   Fever     Time reflects when diagnosis was documented in both MDM as applicable and the Disposition within this note     Time User Action Codes Description Comment    10/3/2022  7:51 PM Dianne Mendez Add [R50 9] Fever       ED Disposition     ED Disposition   Discharge    Condition   Stable    Date/Time   Mon Oct 3, 2022  7:51 PM    Comment   Martha Cunningham discharge to home/self care                 Follow-up Information    None         Discharge Medication List as of 10/3/2022  7:53 PM      CONTINUE these medications which have NOT CHANGED    Details   calcium carbonate (OS-PRETTY) 1250 (500 CA) MG tablet Take 1 tablet by mouth daily  , Until Discontinued, Historical Med      Multiple Vitamin (multivitamin) tablet Take 1 tablet by mouth daily, Historical Med      omeprazole (PriLOSEC) 20 mg delayed release capsule Take 20 mg by mouth as needed, Starting Tue 12/29/2020, Until Mon 5/16/2022 at 2359, Historical Med             No discharge procedures on file      PDMP Review     None          ED Provider  Electronically Signed by           Chely Ogden DO  10/03/22 2004

## 2022-10-04 ENCOUNTER — TELEPHONE (OUTPATIENT)
Dept: FAMILY MEDICINE CLINIC | Facility: CLINIC | Age: 67
End: 2022-10-04

## 2022-10-04 NOTE — TELEPHONE ENCOUNTER
Patient symptoms started on Sunday, she was prescribed for paxlovid by ED, patient feels better overall and said she would be reaching out to her primary care physician

## 2022-11-14 ENCOUNTER — TELEPHONE (OUTPATIENT)
Dept: OTHER | Facility: OTHER | Age: 67
End: 2022-11-14

## 2022-11-14 NOTE — TELEPHONE ENCOUNTER
Patient is calling regarding cancelling an appointment      Date/Time:11/16/22 at 1540    Patient was rescheduled: YES [] NO [x]    Patient requesting call back to reschedule: YES [x] NO []

## 2023-09-14 ENCOUNTER — HOSPITAL ENCOUNTER (EMERGENCY)
Facility: HOSPITAL | Age: 68
Discharge: HOME | End: 2023-09-14
Attending: EMERGENCY MEDICINE | Admitting: EMERGENCY MEDICINE
Payer: COMMERCIAL

## 2023-09-14 ENCOUNTER — APPOINTMENT (EMERGENCY)
Dept: CT IMAGING | Facility: HOSPITAL | Age: 68
End: 2023-09-14
Payer: COMMERCIAL

## 2023-09-14 VITALS
HEART RATE: 70 BPM | WEIGHT: 175 LBS | RESPIRATION RATE: 16 BRPM | TEMPERATURE: 98.1 F | BODY MASS INDEX: 26.52 KG/M2 | HEIGHT: 68 IN | OXYGEN SATURATION: 97 % | SYSTOLIC BLOOD PRESSURE: 135 MMHG | DIASTOLIC BLOOD PRESSURE: 75 MMHG

## 2023-09-14 DIAGNOSIS — R10.9 ABDOMINAL PAIN: Primary | ICD-10-CM

## 2023-09-14 LAB
ALBUMIN SERPL BCP-MCNC: 4.3 G/DL (ref 3.5–5)
ALP SERPL-CCNC: 62 U/L (ref 34–104)
ALT SERPL W P-5'-P-CCNC: 11 U/L (ref 7–52)
ANION GAP SERPL CALCULATED.3IONS-SCNC: 9 MMOL/L
AST SERPL W P-5'-P-CCNC: 33 U/L (ref 13–39)
ATRIAL RATE: 67 BPM
BASOPHILS # BLD AUTO: 0.07 THOUSANDS/ÂΜL (ref 0–0.1)
BASOPHILS NFR BLD AUTO: 1 % (ref 0–1)
BILIRUB SERPL-MCNC: 0.59 MG/DL (ref 0.2–1)
BUN SERPL-MCNC: 13 MG/DL (ref 5–25)
CALCIUM SERPL-MCNC: 9.1 MG/DL (ref 8.4–10.2)
CHLORIDE SERPL-SCNC: 107 MMOL/L (ref 96–108)
CO2 SERPL-SCNC: 20 MMOL/L (ref 21–32)
CREAT SERPL-MCNC: 0.82 MG/DL (ref 0.6–1.3)
EOSINOPHIL # BLD AUTO: 0.07 THOUSAND/ÂΜL (ref 0–0.61)
EOSINOPHIL NFR BLD AUTO: 1 % (ref 0–6)
ERYTHROCYTE [DISTWIDTH] IN BLOOD BY AUTOMATED COUNT: 13.1 % (ref 11.6–15.1)
GFR SERPL CREATININE-BSD FRML MDRD: 74 ML/MIN/1.73SQ M
GLUCOSE SERPL-MCNC: 80 MG/DL (ref 65–140)
HCT VFR BLD AUTO: 40.2 % (ref 34.8–46.1)
HGB BLD-MCNC: 12.5 G/DL (ref 11.5–15.4)
IMM GRANULOCYTES # BLD AUTO: 0.02 THOUSAND/UL (ref 0–0.2)
IMM GRANULOCYTES NFR BLD AUTO: 0 % (ref 0–2)
LIPASE SERPL-CCNC: 17 U/L (ref 11–82)
LYMPHOCYTES # BLD AUTO: 1.99 THOUSANDS/ÂΜL (ref 0.6–4.47)
LYMPHOCYTES NFR BLD AUTO: 33 % (ref 14–44)
MCH RBC QN AUTO: 28.2 PG (ref 26.8–34.3)
MCHC RBC AUTO-ENTMCNC: 31.1 G/DL (ref 31.4–37.4)
MCV RBC AUTO: 91 FL (ref 82–98)
MONOCYTES # BLD AUTO: 0.48 THOUSAND/ÂΜL (ref 0.17–1.22)
MONOCYTES NFR BLD AUTO: 8 % (ref 4–12)
NEUTROPHILS # BLD AUTO: 3.45 THOUSANDS/ÂΜL (ref 1.85–7.62)
NEUTS SEG NFR BLD AUTO: 57 % (ref 43–75)
NRBC BLD AUTO-RTO: 0 /100 WBCS
P AXIS: 69 DEGREES
PLATELET # BLD AUTO: 303 THOUSANDS/UL (ref 149–390)
PMV BLD AUTO: 9.6 FL (ref 8.9–12.7)
POTASSIUM SERPL-SCNC: 4.7 MMOL/L (ref 3.5–5.3)
PR INTERVAL: 196 MS
PROT SERPL-MCNC: 7.6 G/DL (ref 6.4–8.4)
QRS AXIS: 24 DEGREES
QRSD INTERVAL: 78 MS
QT INTERVAL: 390 MS
QTC INTERVAL: 412 MS
RBC # BLD AUTO: 4.44 MILLION/UL (ref 3.81–5.12)
SODIUM SERPL-SCNC: 136 MMOL/L (ref 135–147)
T WAVE AXIS: 67 DEGREES
VENTRICULAR RATE: 67 BPM
WBC # BLD AUTO: 6.08 THOUSAND/UL (ref 4.31–10.16)

## 2023-09-14 PROCEDURE — 93005 ELECTROCARDIOGRAM TRACING: CPT

## 2023-09-14 PROCEDURE — 74177 CT ABD & PELVIS W/CONTRAST: CPT

## 2023-09-14 PROCEDURE — 83690 ASSAY OF LIPASE: CPT | Performed by: EMERGENCY MEDICINE

## 2023-09-14 PROCEDURE — 99285 EMERGENCY DEPT VISIT HI MDM: CPT | Performed by: EMERGENCY MEDICINE

## 2023-09-14 PROCEDURE — G1004 CDSM NDSC: HCPCS

## 2023-09-14 PROCEDURE — 85025 COMPLETE CBC W/AUTO DIFF WBC: CPT | Performed by: EMERGENCY MEDICINE

## 2023-09-14 PROCEDURE — 99284 EMERGENCY DEPT VISIT MOD MDM: CPT

## 2023-09-14 PROCEDURE — 36415 COLL VENOUS BLD VENIPUNCTURE: CPT

## 2023-09-14 PROCEDURE — 80053 COMPREHEN METABOLIC PANEL: CPT | Performed by: EMERGENCY MEDICINE

## 2023-09-14 RX ADMIN — IOHEXOL 100 ML: 350 INJECTION, SOLUTION INTRAVENOUS at 12:20

## 2023-09-14 NOTE — ED PROVIDER NOTES
History  Chief Complaint   Patient presents with   • Abdominal Pain   • Back Pain     Pt states shes has been dealing with chronic mid epigastric abdominal pain abdominal pain that is intermittent with radiation to her back. This week she states it is worse than normal with increased mid back pain that occasionally radiates down the right flank. Pt states she does have degenerative disk disease and has hx of esophageal ulcers in may to which she took mediations and resoled after follow up with GI. Pt denies any urinary symptoms. Pt denies SOB, or CP. Pt denies N/V/D     79 old female presents to the emergency department for evaluation of abdominal pain. Patient states she has epigastric pain that radiates straight through to her back. States this has been intermittent for months to years but seem to worsen over the past week. Patient states pain occasionally radiates into the right flank. Reports she has history of degenerative disc disease and history of esophageal ulcers which she was on medication for in May and had resolved after follow-up with GI. She denies any dysuria, hematuria urinary frequency. Denies chest pain or shortness of breath. Denies nausea vomiting or diarrhea. Patient denies any drug or alcohol use. Denies history of pancreatitis. Patient also reports that she has been eating more than usual and states she lost weight which is abnormal for her. Prior to Admission Medications   Prescriptions Last Dose Informant Patient Reported? Taking? Multiple Vitamin (multivitamin) tablet Not Taking  Yes No   Sig: Take 1 tablet by mouth daily   Patient not taking: Reported on 9/14/2023   calcium carbonate (OS-PRETTY) 1250 (500 CA) MG tablet Not Taking  Yes No   Sig: Take 1 tablet by mouth daily.    Patient not taking: Reported on 9/14/2023   omeprazole (PriLOSEC) 20 mg delayed release capsule   Yes No   Sig: Take 20 mg by mouth as needed      Facility-Administered Medications: None Past Medical History:   Diagnosis Date   • GERD (gastroesophageal reflux disease)    • History of right breast cancer        Past Surgical History:   Procedure Laterality Date   • BREAST LUMPECTOMY     •  SECTION     • TOTAL HIP ARTHROPLASTY Bilateral    • US GUIDED BREAST BIOPSY LEFT COMPLETE Left 2016       Family History   Problem Relation Age of Onset   • Cancer Mother          at age 77   • Heart attack Father          at age 76   • No Known Problems Sister    • Heart attack Brother          at age 61   • No Known Problems Brother      I have reviewed and agree with the history as documented. E-Cigarette/Vaping   • E-Cigarette Use Never User      E-Cigarette/Vaping Substances     Social History     Tobacco Use   • Smoking status: Never   • Smokeless tobacco: Never   Vaping Use   • Vaping Use: Never used   Substance Use Topics   • Alcohol use: Not Currently   • Drug use: Never       Review of Systems   Constitutional: Positive for unexpected weight change. Negative for appetite change, fatigue and fever. Respiratory: Negative. Cardiovascular: Negative. Gastrointestinal: Positive for abdominal pain. Negative for constipation, diarrhea, nausea and vomiting. Genitourinary: Negative. Musculoskeletal: Negative. Skin: Negative. Neurological: Negative. All other systems reviewed and are negative. Physical Exam  Physical Exam  Vitals and nursing note reviewed. Constitutional:       General: She is not in acute distress. Appearance: She is well-developed. She is not ill-appearing, toxic-appearing or diaphoretic. HENT:      Head: Normocephalic. Cardiovascular:      Rate and Rhythm: Normal rate and regular rhythm. Pulmonary:      Effort: Pulmonary effort is normal.      Breath sounds: Normal breath sounds. No stridor. No wheezing, rhonchi or rales. Chest:      Chest wall: No tenderness. Abdominal:      General: Abdomen is flat.  Bowel sounds are normal.      Palpations: Abdomen is soft. Tenderness: There is no abdominal tenderness. There is no right CVA tenderness or left CVA tenderness. Skin:     General: Skin is warm and dry. Capillary Refill: Capillary refill takes less than 2 seconds. Findings: No erythema or rash. Neurological:      General: No focal deficit present. Mental Status: She is alert and oriented to person, place, and time.    Psychiatric:         Mood and Affect: Mood normal.         Vital Signs  ED Triage Vitals   Temperature Pulse Respirations Blood Pressure SpO2   09/14/23 1043 09/14/23 1043 09/14/23 1043 09/14/23 1043 09/14/23 1043   98.1 °F (36.7 °C) 82 16 146/86 100 %      Temp Source Heart Rate Source Patient Position - Orthostatic VS BP Location FiO2 (%)   09/14/23 1043 09/14/23 1043 09/14/23 1043 09/14/23 1043 --   Oral Radial;Left Sitting Left arm       Pain Score       09/14/23 1053       4           Vitals:    09/14/23 1145 09/14/23 1200 09/14/23 1215 09/14/23 1306   BP: 148/76 148/77 145/82 135/75   Pulse: 66 68 68 70   Patient Position - Orthostatic VS: Lying Lying Lying Lying         Visual Acuity      ED Medications  Medications   iohexol (OMNIPAQUE) 350 MG/ML injection (SINGLE-DOSE) 100 mL (100 mL Intravenous Given 9/14/23 1220)       Diagnostic Studies  Results Reviewed     Procedure Component Value Units Date/Time    Comprehensive metabolic panel [[id number]]  (Abnormal) Collected: 09/14/23 1116    Lab Status: Final result Specimen: Blood from Arm, Left Updated: 09/14/23 1147     Sodium 136 mmol/L      Potassium 4.7 mmol/L      Chloride 107 mmol/L      CO2 20 mmol/L      ANION GAP 9 mmol/L      BUN 13 mg/dL      Creatinine 0.82 mg/dL      Glucose 80 mg/dL      Calcium 9.1 mg/dL      AST 33 U/L      ALT 11 U/L      Alkaline Phosphatase 62 U/L      Total Protein 7.6 g/dL      Albumin 4.3 g/dL      Total Bilirubin 0.59 mg/dL      eGFR 74 ml/min/1.73sq m     Narrative:      National Kid[employer] guidelines for Chronic Kidney Disease (CKD):   •  Stage 1 with normal or high GFR (GFR > 90 mL/min/1.73 square meters)  •  Stage 2 Mild CKD (GFR = 60-89 mL/min/1.73 square meters)  •  Stage 3A Moderate CKD (GFR = 45-59 mL/min/1.73 square meters)  •  Stage 3B Moderate CKD (GFR = 30-44 mL/min/1.73 square meters)  •  Stage 4 Severe CKD (GFR = 15-29 mL/min/1.73 square meters)  •  Stage 5 End Stage CKD (GFR <15 mL/min/1.73 square meters)  Note: GFR calculation is accurate only with a steady state creatinine    Lipase [[id number]]  (Normal) Collected: 09/14/23 1116    Lab Status: Final result Specimen: Blood from Arm, Left Updated: 09/14/23 1147     Lipase 17 u/L     CBC and differential [[id number]]  (Abnormal) Collected: 09/14/23 1116    Lab Status: Final result Specimen: Blood from Arm, Left Updated: 09/14/23 1127     WBC 6.08 Thousand/uL      RBC 4.44 Million/uL      Hemoglobin 12.5 g/dL      Hematocrit 40.2 %      MCV 91 fL      MCH 28.2 pg      MCHC 31.1 g/dL      RDW 13.1 %      MPV 9.6 fL      Platelets 648 Thousands/uL      nRBC 0 /100 WBCs      Neutrophils Relative 57 %      Immat GRANS % 0 %      Lymphocytes Relative 33 %      Monocytes Relative 8 %      Eosinophils Relative 1 %      Basophils Relative 1 %      Neutrophils Absolute 3.45 Thousands/µL      Immature Grans Absolute 0.02 Thousand/uL      Lymphocytes Absolute 1.99 Thousands/µL      Monocytes Absolute 0.48 Thousand/µL      Eosinophils Absolute 0.07 Thousand/µL      Basophils Absolute 0.07 Thousands/µL                  CT abdomen pelvis with contrast   Final Result by Wilver Romero MD (09/14 1258)      No acute pathology. Specifically, no acute CT findings to account for the patient's symptoms.             Workstation performed: [id number]                    Procedures  ECG 12 Lead Documentation Only    Date/Time: 9/14/2023 12:15 PM    Performed by: Alejandra John PA-C  Authorized by: Alejandra John PA-C    Patient location: ED  Interpretation:     Interpretation: non-specific    Rate:     ECG rate:  67    ECG rate assessment: normal    Rhythm:     Rhythm: sinus rhythm    Ectopy:     Ectopy: none    QRS:     QRS axis:  Normal    QRS intervals:  Normal  Conduction:     Conduction: normal    ST segments:     ST segments:  Normal  T waves:     T waves: normal               ED Course  ED Course as of 09/14/23 1316   Thu Sep 14, 2023   1209 WBC: 6.08   1210 Lipase: 17   1301 CT abd: No acute pathology. Specifically, no acute CT findings to account for the patient's symptoms. SBIRT 22yo+    Flowsheet Row Most Recent Value   Initial Alcohol Screen: US AUDIT-C     1. How often do you have a drink containing alcohol? 0 Filed at: 09/14/2023 1116   2. How many drinks containing alcohol do you have on a typical day you are drinking? 0 Filed at: 09/14/2023 1116   3a. Male UNDER 65: How often do you have five or more drinks on one occasion? 0 Filed at: 09/14/2023 1116   3b. FEMALE Any Age, or MALE 65+: How often do you have 4 or more drinks on one occassion? 0 Filed at: 09/14/2023 1116   Audit-C Score 0 Filed at: 09/14/2023 1116   PANDA: How many times in the past year have you. .. Used an illegal drug or used a prescription medication for non-medical reasons? Never Filed at: 09/14/2023 1116                    Medical Decision Making  80-year-old female presents the emergency department for evaluation of abdominal pain. Vitals and medical record reviewed. Patient at risk for pancreatitis, gastritis, cholecystitis. On exam abdomen is soft and nontender. Amatory findings unremarkable. No leukocytosis. Lipase within normal limits. CT unremarkable. Did discuss results and findings with the patient. We discussed refollow-up with her GI specialist.  We discussed return precautions and she verbalized understanding.   Patient was clinically and hemodynamically stable for discharge    Abdominal pain: acute illness or injury  Amount and/or Complexity of Data Reviewed  Labs: ordered. Decision-making details documented in ED Course. Radiology: ordered. Risk  Prescription drug management. Disposition  Final diagnoses:   Abdominal pain     Time reflects when diagnosis was documented in both MDM as applicable and the Disposition within this note     Time User Action Codes Description Comment    9/14/2023  1:02 PM Mei Paris Add [R10.9] Abdominal pain       ED Disposition     ED Disposition   Discharge    Condition   Stable    Date/Time   Thu Sep 14, 2023  1:02 PM    Comment   Martha Cunningham discharge to home/self care. Follow-up Information     Follow up With Specialties Details Why Contact Info    Melany Ceja DO Family Medicine   [address] [place] [address]  [phone]            Discharge Medication List as of 9/14/2023  1:02 PM      CONTINUE these medications which have NOT CHANGED    Details   calcium carbonate (OS-PRETTY) 1250 (500 CA) MG tablet Take 1 tablet by mouth daily. , Until Discontinued, Historical Med      Multiple Vitamin (multivitamin) tablet Take 1 tablet by mouth daily, Historical Med      omeprazole (PriLOSEC) 20 mg delayed release capsule Take 20 mg by mouth as needed, Starting Tue 12/29/2020, Until Mon 5/16/2022 at 2359, Historical Med             No discharge procedures on file.     PDMP Review     None          ED Provider  Electronically Signed by           Mei Paris PA-C  09/14/23 [id number]

## 2023-09-15 NOTE — ED ATTENDING ATTESTATION
9/14/2023  ILeonila DO, I have discussed the patient with the resident/non-physician practitioner and agree with the resident's/non-physician practitioner's findings, Plan of Care, and MDM as documented in the resident's/non-physician practitioner's note, except where noted. All available labs and Radiology studies were reviewed. I was present for key portions of any procedure(s) performed by the resident/non-physician practitioner and I was immediately available to provide assistance. At this point I agree with the current assessment done in the Emergency Department.   I have conducted an independent evaluation of this patient a history and physical is as follows:    ED Course         Critical Care Time  Procedures

## 2024-03-19 ENCOUNTER — APPOINTMENT (EMERGENCY)
Dept: CT IMAGING | Facility: HOSPITAL | Age: 69
End: 2024-03-19
Payer: COMMERCIAL

## 2024-03-19 ENCOUNTER — APPOINTMENT (EMERGENCY)
Dept: RADIOLOGY | Facility: HOSPITAL | Age: 69
End: 2024-03-19
Payer: COMMERCIAL

## 2024-03-19 ENCOUNTER — HOSPITAL ENCOUNTER (EMERGENCY)
Facility: HOSPITAL | Age: 69
Discharge: HOME | End: 2024-03-20
Attending: EMERGENCY MEDICINE
Payer: COMMERCIAL

## 2024-03-19 VITALS
WEIGHT: 179.01 LBS | DIASTOLIC BLOOD PRESSURE: 81 MMHG | RESPIRATION RATE: 18 BRPM | HEART RATE: 76 BPM | OXYGEN SATURATION: 97 % | SYSTOLIC BLOOD PRESSURE: 172 MMHG | TEMPERATURE: 98.4 F | BODY MASS INDEX: 27.22 KG/M2

## 2024-03-19 DIAGNOSIS — I10 HTN (HYPERTENSION): ICD-10-CM

## 2024-03-19 DIAGNOSIS — R00.2 PALPITATIONS: Primary | ICD-10-CM

## 2024-03-19 DIAGNOSIS — R07.89 ATYPICAL CHEST PAIN: ICD-10-CM

## 2024-03-19 LAB
ALBUMIN SERPL BCP-MCNC: 4.5 G/DL (ref 3.5–5)
ALP SERPL-CCNC: 89 U/L (ref 34–104)
ALT SERPL W P-5'-P-CCNC: 21 U/L (ref 7–52)
ANION GAP SERPL CALCULATED.3IONS-SCNC: 9 MMOL/L (ref 4–13)
APTT PPP: 26 SECONDS (ref 23–37)
AST SERPL W P-5'-P-CCNC: 22 U/L (ref 13–39)
BASOPHILS # BLD AUTO: 0.01 THOUSANDS/ÂΜL (ref 0–0.1)
BASOPHILS NFR BLD AUTO: 0 % (ref 0–1)
BILIRUB SERPL-MCNC: 0.3 MG/DL (ref 0.2–1)
BNP SERPL-MCNC: 122 PG/ML (ref 0–100)
BUN SERPL-MCNC: 22 MG/DL (ref 5–25)
CALCIUM SERPL-MCNC: 9.6 MG/DL (ref 8.4–10.2)
CARDIAC TROPONIN I PNL SERPL HS: <2 NG/L
CARDIAC TROPONIN I PNL SERPL HS: <2 NG/L
CHLORIDE SERPL-SCNC: 104 MMOL/L (ref 96–108)
CO2 SERPL-SCNC: 23 MMOL/L (ref 21–32)
CREAT SERPL-MCNC: 0.8 MG/DL (ref 0.6–1.3)
D DIMER PPP FEU-MCNC: <0.27 UG/ML FEU
EOSINOPHIL # BLD AUTO: 0 THOUSAND/ÂΜL (ref 0–0.61)
EOSINOPHIL NFR BLD AUTO: 0 % (ref 0–6)
ERYTHROCYTE [DISTWIDTH] IN BLOOD BY AUTOMATED COUNT: 12.8 % (ref 11.6–15.1)
GFR SERPL CREATININE-BSD FRML MDRD: 75 ML/MIN/1.73SQ M
GLUCOSE SERPL-MCNC: 135 MG/DL (ref 65–140)
HCT VFR BLD AUTO: 36.4 % (ref 34.8–46.1)
HGB BLD-MCNC: 12.1 G/DL (ref 11.5–15.4)
IMM GRANULOCYTES # BLD AUTO: 0.11 THOUSAND/UL (ref 0–0.2)
IMM GRANULOCYTES NFR BLD AUTO: 1 % (ref 0–2)
INR PPP: 0.92 (ref 0.84–1.19)
LIPASE SERPL-CCNC: 19 U/L (ref 11–82)
LYMPHOCYTES # BLD AUTO: 2.29 THOUSANDS/ÂΜL (ref 0.6–4.47)
LYMPHOCYTES NFR BLD AUTO: 13 % (ref 14–44)
MAGNESIUM SERPL-MCNC: 2 MG/DL (ref 1.9–2.7)
MCH RBC QN AUTO: 28.1 PG (ref 26.8–34.3)
MCHC RBC AUTO-ENTMCNC: 33.2 G/DL (ref 31.4–37.4)
MCV RBC AUTO: 85 FL (ref 82–98)
MONOCYTES # BLD AUTO: 0.68 THOUSAND/ÂΜL (ref 0.17–1.22)
MONOCYTES NFR BLD AUTO: 4 % (ref 4–12)
NEUTROPHILS # BLD AUTO: 14.65 THOUSANDS/ÂΜL (ref 1.85–7.62)
NEUTS SEG NFR BLD AUTO: 82 % (ref 43–75)
NRBC BLD AUTO-RTO: 0 /100 WBCS
PLATELET # BLD AUTO: 352 THOUSANDS/UL (ref 149–390)
PMV BLD AUTO: 9.8 FL (ref 8.9–12.7)
POTASSIUM SERPL-SCNC: 3.9 MMOL/L (ref 3.5–5.3)
PROT SERPL-MCNC: 7.8 G/DL (ref 6.4–8.4)
PROTHROMBIN TIME: 12.7 SECONDS (ref 11.6–14.5)
RBC # BLD AUTO: 4.31 MILLION/UL (ref 3.81–5.12)
SODIUM SERPL-SCNC: 136 MMOL/L (ref 135–147)
WBC # BLD AUTO: 17.74 THOUSAND/UL (ref 4.31–10.16)

## 2024-03-19 PROCEDURE — 85730 THROMBOPLASTIN TIME PARTIAL: CPT | Performed by: EMERGENCY MEDICINE

## 2024-03-19 PROCEDURE — 70450 CT HEAD/BRAIN W/O DYE: CPT

## 2024-03-19 PROCEDURE — 83690 ASSAY OF LIPASE: CPT | Performed by: EMERGENCY MEDICINE

## 2024-03-19 PROCEDURE — 96374 THER/PROPH/DIAG INJ IV PUSH: CPT

## 2024-03-19 PROCEDURE — 71045 X-RAY EXAM CHEST 1 VIEW: CPT

## 2024-03-19 PROCEDURE — 99285 EMERGENCY DEPT VISIT HI MDM: CPT

## 2024-03-19 PROCEDURE — 99285 EMERGENCY DEPT VISIT HI MDM: CPT | Performed by: EMERGENCY MEDICINE

## 2024-03-19 PROCEDURE — 80053 COMPREHEN METABOLIC PANEL: CPT | Performed by: EMERGENCY MEDICINE

## 2024-03-19 PROCEDURE — 84484 ASSAY OF TROPONIN QUANT: CPT | Performed by: EMERGENCY MEDICINE

## 2024-03-19 PROCEDURE — 83880 ASSAY OF NATRIURETIC PEPTIDE: CPT | Performed by: EMERGENCY MEDICINE

## 2024-03-19 PROCEDURE — 85025 COMPLETE CBC W/AUTO DIFF WBC: CPT | Performed by: EMERGENCY MEDICINE

## 2024-03-19 PROCEDURE — 85379 FIBRIN DEGRADATION QUANT: CPT | Performed by: EMERGENCY MEDICINE

## 2024-03-19 PROCEDURE — 36415 COLL VENOUS BLD VENIPUNCTURE: CPT | Performed by: EMERGENCY MEDICINE

## 2024-03-19 PROCEDURE — 96361 HYDRATE IV INFUSION ADD-ON: CPT

## 2024-03-19 PROCEDURE — 83735 ASSAY OF MAGNESIUM: CPT | Performed by: EMERGENCY MEDICINE

## 2024-03-19 PROCEDURE — 85610 PROTHROMBIN TIME: CPT | Performed by: EMERGENCY MEDICINE

## 2024-03-19 PROCEDURE — 93005 ELECTROCARDIOGRAM TRACING: CPT

## 2024-03-19 RX ORDER — CLONIDINE HYDROCHLORIDE 0.1 MG/1
0.1 TABLET ORAL ONCE
Status: COMPLETED | OUTPATIENT
Start: 2024-03-19 | End: 2024-03-19

## 2024-03-19 RX ORDER — DIPHENHYDRAMINE HYDROCHLORIDE AND LIDOCAINE HYDROCHLORIDE AND ALUMINUM HYDROXIDE AND MAGNESIUM HYDRO
10 KIT ONCE
Status: COMPLETED | OUTPATIENT
Start: 2024-03-19 | End: 2024-03-19

## 2024-03-19 RX ORDER — KETOROLAC TROMETHAMINE 30 MG/ML
15 INJECTION, SOLUTION INTRAMUSCULAR; INTRAVENOUS ONCE
Status: COMPLETED | OUTPATIENT
Start: 2024-03-19 | End: 2024-03-19

## 2024-03-19 RX ADMIN — DIPHENHYDRAMINE HYDROCHLORIDE AND LIDOCAINE HYDROCHLORIDE AND ALUMINUM HYDROXIDE AND MAGNESIUM HYDRO 10 ML: KIT at 21:37

## 2024-03-19 RX ADMIN — CLONIDINE HYDROCHLORIDE 0.1 MG: 0.1 TABLET ORAL at 22:13

## 2024-03-19 RX ADMIN — KETOROLAC TROMETHAMINE 15 MG: 30 INJECTION, SOLUTION INTRAMUSCULAR at 22:13

## 2024-03-19 RX ADMIN — SODIUM CHLORIDE 1000 ML: 0.9 INJECTION, SOLUTION INTRAVENOUS at 21:34

## 2024-03-19 NOTE — Clinical Note
Martha Cunningham was seen and treated in our emergency department on 3/19/2024.                Diagnosis:     Martha  may return to work on return date.    She may return on this date: 03/21/2024         If you have any questions or concerns, please don't hesitate to call.      Rahel Segundo RN    ______________________________           _______________          _______________  Hospital Representative                              Date                                Time

## 2024-03-20 LAB
ATRIAL RATE: 84 BPM
P AXIS: 67 DEGREES
PR INTERVAL: 178 MS
QRS AXIS: 19 DEGREES
QRSD INTERVAL: 90 MS
QT INTERVAL: 366 MS
QTC INTERVAL: 432 MS
T WAVE AXIS: 50 DEGREES
VENTRICULAR RATE: 84 BPM

## 2024-03-20 NOTE — ED PROVIDER NOTES
History  Chief Complaint   Patient presents with    Palpitations     Pt reports heart palpitations she could feel beating out of her chest, HA, and heart burn in the middle of her chest that started around 1800 while she was up walking around the house. Does not have a cardiac history but her dad does. Her blood pressure was elevated at home.      Patient is a 68-year-old female with history of GERD and breast cancer and lumpectomy 20 years ago presents to the emergency department complaining of palpitations elevated blood pressure when she took it at home this evening and heartburn in the substernal chest symptoms onset around 5:30 PM this evening have now improved but not entirely subsided.  Describes feeling as though her heart was racing fast this feeling is entirely resolved now.        History provided by:  Patient      Prior to Admission Medications   Prescriptions Last Dose Informant Patient Reported? Taking?   Multiple Vitamin (multivitamin) tablet   Yes No   Sig: Take 1 tablet by mouth daily   Patient not taking: Reported on 2023   calcium carbonate (OS-PRETTY) 1250 (500 CA) MG tablet   Yes No   Sig: Take 1 tablet by mouth daily.   Patient not taking: Reported on 2023   omeprazole (PriLOSEC) 20 mg delayed release capsule   Yes No   Sig: Take 20 mg by mouth as needed      Facility-Administered Medications: None       Past Medical History:   Diagnosis Date    GERD (gastroesophageal reflux disease)     History of right breast cancer        Past Surgical History:   Procedure Laterality Date    BREAST LUMPECTOMY       SECTION      TOTAL HIP ARTHROPLASTY Bilateral     US GUIDED BREAST BIOPSY LEFT COMPLETE Left 2016       Family History   Problem Relation Age of Onset    Cancer Mother          at age 66    Heart attack Father          at age 74    No Known Problems Sister     Heart attack Brother          at age 60    No Known Problems Brother      I have reviewed and agree with  the history as documented.    E-Cigarette/Vaping    E-Cigarette Use Never User      E-Cigarette/Vaping Substances     Social History     Tobacco Use    Smoking status: Never    Smokeless tobacco: Never   Vaping Use    Vaping status: Never Used   Substance Use Topics    Alcohol use: Not Currently    Drug use: Never       Review of Systems   Constitutional:  Positive for fever. Negative for activity change and appetite change.   HENT:  Negative for congestion and sore throat.    Respiratory:  Negative for cough and shortness of breath.    Cardiovascular:  Positive for chest pain and palpitations.   Gastrointestinal:  Negative for abdominal pain, diarrhea, nausea and vomiting.        Heart burn   Skin:  Negative for rash.   Neurological:  Positive for headaches. Negative for weakness and numbness.   All other systems reviewed and are negative.      Physical Exam  Physical Exam  Vitals and nursing note reviewed.   Constitutional:       General: She is not in acute distress.     Appearance: Normal appearance.   HENT:      Head: Normocephalic and atraumatic.      Nose: Nose normal.      Mouth/Throat:      Mouth: Mucous membranes are moist.   Eyes:      Conjunctiva/sclera: Conjunctivae normal.      Pupils: Pupils are equal, round, and reactive to light.   Cardiovascular:      Rate and Rhythm: Normal rate and regular rhythm.      Heart sounds: Normal heart sounds.   Pulmonary:      Effort: Pulmonary effort is normal. No respiratory distress.      Breath sounds: Normal breath sounds.   Chest:      Chest wall: No tenderness.   Abdominal:      General: There is no distension.   Musculoskeletal:         General: Normal range of motion.      Cervical back: Normal range of motion.   Skin:     General: Skin is warm and dry.      Findings: No rash.   Neurological:      General: No focal deficit present.      Mental Status: She is alert and oriented to person, place, and time.         Vital Signs  ED Triage Vitals   Temperature  Pulse Respirations Blood Pressure SpO2   03/19/24 2126 03/19/24 2126 03/19/24 2126 03/19/24 2126 03/19/24 2126   98.4 °F (36.9 °C) 84 18 144/98 100 %      Temp Source Heart Rate Source Patient Position - Orthostatic VS BP Location FiO2 (%)   03/19/24 2126 03/19/24 2126 03/19/24 2126 03/19/24 2126 --   Temporal Monitor Lying Left arm       Pain Score       03/19/24 2213       2           Vitals:    03/19/24 2126 03/19/24 2200 03/19/24 2230   BP: 144/98 (!) 181/84 (!) 172/81   Pulse: 84 70 76   Patient Position - Orthostatic VS: Lying Lying          Visual Acuity      ED Medications  Medications   sodium chloride 0.9 % bolus 1,000 mL (0 mL Intravenous Stopped 3/19/24 2215)   diphenhydramine, lidocaine, Al/Mg hydroxide, simethicone (Magic Mouthwash) oral solution 10 mL (10 mL Swish & Swallow Given 3/19/24 2137)   cloNIDine (CATAPRES) tablet 0.1 mg (0.1 mg Oral Given 3/19/24 2213)   ketorolac (TORADOL) injection 15 mg (15 mg Intravenous Given 3/19/24 2213)       Diagnostic Studies  Results Reviewed       Procedure Component Value Units Date/Time    HS Troponin I 2hr [[id number]] Collected: 03/19/24 2252    Lab Status: Final result Specimen: Blood from Arm, Right Updated: 03/19/24 2318     hs TnI 2hr <2 ng/L      Delta 2hr hsTnI --    B-Type Natriuretic Peptide(BNP) [[id number]]  (Abnormal) Collected: 03/19/24 2135    Lab Status: Final result Specimen: Blood from Arm, Right Updated: 03/19/24 2248      pg/mL     D-Dimer [[id number]]  (Normal) Collected: 03/19/24 2135    Lab Status: Final result Specimen: Blood from Arm, Right Updated: 03/19/24 2227     D-Dimer, Quant <0.27 ug/ml FEU     Narrative:      In the evaluation for possible pulmonary embolism, in the appropriate (Well's Score of 4 or less) patient, the age adjusted d-dimer cutoff for this patient can be calculated as:    Age x 0.01 (in ug/mL) for Age-adjusted D-dimer exclusion threshold for a patient over 50 years.    HS Troponin 0hr (reflex protocol)  [[id number]]  (Normal) Collected: 03/19/24 2135    Lab Status: Final result Specimen: Blood from Arm, Right Updated: 03/19/24 2208     hs TnI 0hr <2 ng/L     Comprehensive metabolic panel [[id number]] Collected: 03/19/24 2135    Lab Status: Final result Specimen: Blood from Arm, Right Updated: 03/19/24 2201     Sodium 136 mmol/L      Potassium 3.9 mmol/L      Chloride 104 mmol/L      CO2 23 mmol/L      ANION GAP 9 mmol/L      BUN 22 mg/dL      Creatinine 0.80 mg/dL      Glucose 135 mg/dL      Calcium 9.6 mg/dL      AST 22 U/L      ALT 21 U/L      Alkaline Phosphatase 89 U/L      Total Protein 7.8 g/dL      Albumin 4.5 g/dL      Total Bilirubin 0.30 mg/dL      eGFR 75 ml/min/1.73sq m     Narrative:      [employer] Kid[employer] guidelines for Chronic Kidney Disease (CKD):     Stage 1 with normal or high GFR (GFR > 90 mL/min/1.73 square meters)    Stage 2 Mild CKD (GFR = 60-89 mL/min/1.73 square meters)    Stage 3A Moderate CKD (GFR = 45-59 mL/min/1.73 square meters)    Stage 3B Moderate CKD (GFR = 30-44 mL/min/1.73 square meters)    Stage 4 Severe CKD (GFR = 15-29 mL/min/1.73 square meters)    Stage 5 End Stage CKD (GFR <15 mL/min/1.73 square meters)  Note: GFR calculation is accurate only with a steady state creatinine    Magnesium [[id number]]  (Normal) Collected: 03/19/24 2135    Lab Status: Final result Specimen: Blood from Arm, Right Updated: 03/19/24 2201     Magnesium 2.0 mg/dL     Lipase [[id number]]  (Normal) Collected: 03/19/24 2135    Lab Status: Final result Specimen: Blood from Arm, Right Updated: 03/19/24 2201     Lipase 19 u/L     Protime-INR [[id number]]  (Normal) Collected: 03/19/24 2135    Lab Status: Final result Specimen: Blood from Arm, Right Updated: 03/19/24 2200     Protime 12.7 seconds      INR 0.92    APTT [[id number]]  (Normal) Collected: 03/19/24 2135    Lab Status: Final result Specimen: Blood from Arm, Right Updated: 03/19/24 2200     PTT 26 seconds     CBC and differential  [[id number]]  (Abnormal) Collected: 03/19/24 2135    Lab Status: Final result Specimen: Blood from Arm, Right Updated: 03/19/24 2144     WBC 17.74 Thousand/uL      RBC 4.31 Million/uL      Hemoglobin 12.1 g/dL      Hematocrit 36.4 %      MCV 85 fL      MCH 28.1 pg      MCHC 33.2 g/dL      RDW 12.8 %      MPV 9.8 fL      Platelets 352 Thousands/uL      nRBC 0 /100 WBCs      Neutrophils Relative 82 %      Immature Grans % 1 %      Lymphocytes Relative 13 %      Monocytes Relative 4 %      Eosinophils Relative 0 %      Basophils Relative 0 %      Neutrophils Absolute 14.65 Thousands/µL      Absolute Immature Grans 0.11 Thousand/uL      Absolute Lymphocytes 2.29 Thousands/µL      Absolute Monocytes 0.68 Thousand/µL      Eosinophils Absolute 0.00 Thousand/µL      Basophils Absolute 0.01 Thousands/µL                    CT head without contrast   Final Result by Carlito Menendez MD (03/19 2340)      No acute intracranial abnormality.                  Workstation performed: [id number]         XR chest 1 view portable   ED Interpretation by Vitor Shore DO (03/19 2159)   Surgical clips right breast region no acute cardiopulmonary disease                 Procedures  ECG 12 Lead Documentation Only    Date/Time: 3/19/2024 9:33 PM    Performed by: Vitor Shore DO  Authorized by: Vitor Shore DO    ECG reviewed by me, the ED Provider: yes    Patient location:  ED  Previous ECG:     Comparison to cardiac monitor: Yes    Quality:     Tracing quality:  Limited by artifact  Rate:     ECG rate:  84    ECG rate assessment: normal    Rhythm:     Rhythm: sinus rhythm    Ectopy:     Ectopy: PAC    QRS:     QRS axis:  Normal    QRS intervals:  Normal  Conduction:     Conduction: normal    ST segments:     ST segments:  Normal  T waves:     T waves: normal             ED Course  ED Course as of 03/19/24 2356   Tue Mar 19, 2024   2321 BNP(!): 122  Noted no evidence of acute decompensated congestive heart failure present              HEART Risk Score      Flowsheet Row Most Recent Value   Heart Score Risk Calculator    History 0 Filed at: 03/19/2024 2332   ECG 0 Filed at: 03/19/2024 2332   Age 2 Filed at: 03/19/2024 2332   Risk Factors 1 Filed at: 03/19/2024 2332   Troponin 0 Filed at: 03/19/2024 2332   HEART Score 3 Filed at: 03/19/2024 2332                                        Medical Decision Making  Differential diagnosis included but not limited to acute coronary syndrome arrhythmia pneumonia pneumothorax intracranial hemorrhage stroke.  Patient remained clinically and hemodynamically stable in the emergency department.  Symptoms improved and resolved with treatment in the ED. workup in the ED revealed no evidence of acute injury cranial pathology or acute cardiopulmonary pathology negative high-sensitivity troponin x 2 heart score is 3 recommendation based on ACS algorithm is for DC with PCP follow-up.  Patient's hypertension was treated in the ED there is no evidence of hypertensive urgency or hypertensive emergency symptoms were improved and resolved.  For now we will recommend starting antihypertensive medication and advised to contact primary physician promptly for follow-up and recheck of blood pressure and obtain test results and further evaluation and treatment patient understands instructions and agrees and will follow-up promptly as advised.  return precautions and anticipatory guidance discussed.      Problems Addressed:  Atypical chest pain: acute illness or injury  HTN (hypertension): acute illness or injury  Palpitations: acute illness or injury    Amount and/or Complexity of Data Reviewed  Labs: ordered. Decision-making details documented in ED Course.  Radiology: ordered and independent interpretation performed. Decision-making details documented in ED Course.  ECG/medicine tests: ordered and independent interpretation performed. Decision-making details documented in ED Course.    Risk  Prescription drug  management.             Disposition  Final diagnoses:   Palpitations   HTN (hypertension)   Atypical chest pain     Time reflects when diagnosis was documented in both MDM as applicable and the Disposition within this note       Time User Action Codes Description Comment    3/19/2024 11:32 PM Vitor Shore [R00.2] Palpitations     3/19/2024 11:32 PM Vitor Shore [I10] HTN (hypertension)     3/19/2024 11:33 PM Vitor Shore [R07.89] Atypical chest pain           ED Disposition       ED Disposition   Discharge    Condition   Stable    Date/Time   Tue Mar 19, 2024 2350    Comment   Martha Cunningham discharge to home/self care.                   Follow-up Information       Follow up With Specialties Details Why Contact Info    Keiry Fay DO Family Medicine Schedule an appointment as soon as possible for a visit in 2 days  [address] [place] [id number]  [phone]              Patient's Medications   Discharge Prescriptions    METOPROLOL TARTRATE (LOPRESSOR) 25 MG TABLET    Take 0.5 tablets (12.5 mg total) by mouth every 12 (twelve) hours for 20 days       Start Date: 3/19/2024 End Date: 4/8/2024       Order Dose: 12.5 mg       Quantity: 20 tablet    Refills: 0       No discharge procedures on file.    PDMP Review       None            ED Provider  Electronically Signed by             Vitor Shore DO  03/19/24 9142

## 2025-01-18 ENCOUNTER — APPOINTMENT (EMERGENCY)
Dept: RADIOLOGY | Facility: HOSPITAL | Age: 70
End: 2025-01-18
Payer: COMMERCIAL

## 2025-01-18 ENCOUNTER — HOSPITAL ENCOUNTER (EMERGENCY)
Facility: HOSPITAL | Age: 70
Discharge: HOME | End: 2025-01-18
Attending: EMERGENCY MEDICINE
Payer: COMMERCIAL

## 2025-01-18 VITALS
DIASTOLIC BLOOD PRESSURE: 91 MMHG | BODY MASS INDEX: 26.28 KG/M2 | HEART RATE: 91 BPM | TEMPERATURE: 98.1 F | HEIGHT: 68 IN | OXYGEN SATURATION: 98 % | WEIGHT: 173.4 LBS | RESPIRATION RATE: 18 BRPM | SYSTOLIC BLOOD PRESSURE: 107 MMHG

## 2025-01-18 DIAGNOSIS — S22.42XA CLOSED FRACTURE OF MULTIPLE RIBS OF LEFT SIDE, INITIAL ENCOUNTER: Primary | ICD-10-CM

## 2025-01-18 PROCEDURE — 99284 EMERGENCY DEPT VISIT MOD MDM: CPT | Performed by: EMERGENCY MEDICINE

## 2025-01-18 PROCEDURE — 99283 EMERGENCY DEPT VISIT LOW MDM: CPT

## 2025-01-18 PROCEDURE — 71101 X-RAY EXAM UNILAT RIBS/CHEST: CPT

## 2025-01-18 RX ORDER — HYDROCODONE BITARTRATE AND ACETAMINOPHEN 5; 325 MG/1; MG/1
1 TABLET ORAL EVERY 6 HOURS PRN
Qty: 12 TABLET | Refills: 0 | Status: SHIPPED | OUTPATIENT
Start: 2025-01-18 | End: 2025-01-21

## 2025-01-18 NOTE — DISCHARGE INSTRUCTIONS
Use incentive spirometry frequently throughout the day as discussed.  Continue meloxicam as previously prescribed.  Take Norco as needed for breakthrough pain.  Activity as tolerated, recommend no strenuous activity until pain improves.  Follow-up closely with primary care.  Return to the ED for any new or worsening symptoms or concerns.

## 2025-01-18 NOTE — ED PROVIDER NOTES
ED Disposition       None          Assessment & Plan   {Hyperlinks  Risk Stratification - NIHSS - HEART SCORE - Fill out sepsis note and make sure you call [phone] if severe or septic shock:[id number]}    Medical Decision Making           Medications - No data to display    ED Risk Strat Scores                                              History of Present Illness   {Hyperlinks  History (Med, Surg, Fam, Social) - Current Medications - Allergies  :[id number]}    Chief Complaint   Patient presents with    Rib Pain     Pt struck left side of ribs off of bath tub 2.5 weeks ago and has had dull pain since but today pain has become more severe. Increased pain in coughing and movement       Past Medical History:   Diagnosis Date    GERD (gastroesophageal reflux disease)     History of right breast cancer       Past Surgical History:   Procedure Laterality Date    BREAST LUMPECTOMY       SECTION      REPLACEMENT TOTAL KNEE Left     TOTAL HIP ARTHROPLASTY Bilateral     US GUIDED BREAST BIOPSY LEFT COMPLETE Left 2016      Family History   Problem Relation Age of Onset    Cancer Mother          at age 66    Heart attack Father          at age 74    No Known Problems Sister     Heart attack Brother          at age 60    No Known Problems Brother       Social History     Tobacco Use    Smoking status: Never    Smokeless tobacco: Never   Vaping Use    Vaping status: Never Used   Substance Use Topics    Alcohol use: Not Currently    Drug use: Never      E-Cigarette/Vaping    E-Cigarette Use Never User       E-Cigarette/Vaping Substances      I have reviewed and agree with the history as documented.     HPI    Review of Systems        Objective   {Hyperlinks  Historical Vitals - Historical Labs - Chart Review/Microbiology - Last Echo - Code Status  :[id number]}    ED Triage Vitals [25 0200]   Temperature Pulse Blood Pressure Respirations SpO2 Patient Position - Orthostatic VS   98.1 °F (36.7 °C)  91 107/91 18 98 % Sitting      Temp Source Heart Rate Source BP Location FiO2 (%) Pain Score    Temporal Monitor Left arm -- --      Vitals      Date and Time Temp Pulse SpO2 Resp BP Pain Score FACES Pain Rating User   01/18/25 0200 98.1 °F (36.7 °C) 91 98 % 18 107/91 -- -- SR            Physical Exam    Results Reviewed       None            No orders to display       Procedures    ED Medication and Procedure Management   Prior to Admission Medications   Prescriptions Last Dose Informant Patient Reported? Taking?   Multiple Vitamin (multivitamin) tablet   Yes No   Sig: Take 1 tablet by mouth daily   Patient not taking: Reported on 9/14/2023   calcium carbonate (OS-PRETTY) 1250 (500 CA) MG tablet   Yes No   Sig: Take 1 tablet by mouth daily.   Patient not taking: Reported on 9/14/2023   metoprolol tartrate (LOPRESSOR) 25 mg tablet   No No   Sig: Take 0.5 tablets (12.5 mg total) by mouth every 12 (twelve) hours for 20 days   omeprazole (PriLOSEC) 20 mg delayed release capsule   Yes No   Sig: Take 20 mg by mouth as needed      Facility-Administered Medications: None     Patient's Medications   Discharge Prescriptions    No medications on file     No discharge procedures on file.  ED SEPSIS DOCUMENTATION          Temp Pulse SpO2 Resp BP Pain Score FACES Pain Rating User   01/18/25 0200 98.1 °F (36.7 °C) 91 98 % 18 107/91 -- -- SR            Physical Exam  Vitals and nursing note reviewed.   Constitutional:       General: She is not in acute distress.     Appearance: Normal appearance. She is well-developed.      Comments: Mild discomfort apparent with movement.   HENT:      Head: Normocephalic and atraumatic.   Eyes:      Conjunctiva/sclera: Conjunctivae normal.   Cardiovascular:      Rate and Rhythm: Normal rate and regular rhythm.      Pulses: Normal pulses.      Heart sounds: Normal heart sounds. No murmur heard.     No friction rub. No gallop.   Pulmonary:      Effort: Pulmonary effort is normal. No respiratory distress.      Breath sounds: Normal breath sounds. No wheezing, rhonchi or rales.      Comments: Left low anterolateral chest wall tenderness to palpation.  No crepitus, swelling, visible ecchymosis, or other obvious deformity.  No flail chest.  Chest:      Chest wall: Tenderness present.   Abdominal:      General: Abdomen is flat.      Palpations: Abdomen is soft.      Tenderness: There is no abdominal tenderness. There is no guarding or rebound.   Musculoskeletal:         General: No swelling.      Cervical back: Normal range of motion and neck supple.   Skin:     General: Skin is warm and dry.      Capillary Refill: Capillary refill takes less than 2 seconds.   Neurological:      General: No focal deficit present.      Mental Status: She is alert and oriented to person, place, and time.   Psychiatric:         Mood and Affect: Mood normal.         Behavior: Behavior normal.         Results Reviewed       None            XR ribs with pa chest min 3 views LEFT   ED Interpretation by Tip Og MD (01/18 0605)   2 possible nondisplaced rib fractures noted on the left.  No associated pneumothorax appreciated.      Final Interpretation by Gordon Bolton MD (01/18 1130)      Left sixth and seventh  rib fractures suggested near the costochondral junction.         No acute cardiopulmonary disease.         Computerized Assisted Algorithm (CAA) may have been used to analyze all applicable images.         Workstation performed: [id number]             Procedures    ED Medication and Procedure Management   Prior to Admission Medications   Prescriptions Last Dose Informant Patient Reported? Taking?   Multiple Vitamin (multivitamin) tablet   Yes No   Sig: Take 1 tablet by mouth daily   Patient not taking: Reported on 9/14/2023   calcium carbonate (OS-PRETTY) 1250 (500 CA) MG tablet   Yes No   Sig: Take 1 tablet by mouth daily.   Patient not taking: Reported on 9/14/2023   metoprolol tartrate (LOPRESSOR) 25 mg tablet   No No   Sig: Take 0.5 tablets (12.5 mg total) by mouth every 12 (twelve) hours for 20 days   omeprazole (PriLOSEC) 20 mg delayed release capsule   Yes No   Sig: Take 20 mg by mouth as needed      Facility-Administered Medications: None     Discharge Medication List as of 1/18/2025  6:12 AM        START taking these medications    Details   HYDROcodone-acetaminophen (Norco) 5-325 mg per tablet Take 1 tablet by mouth every 6 (six) hours as needed for pain for up to 3 days Max Daily Amount: 4 tablets, Starting Sat 1/18/2025, Until Tue 1/21/2025 at 2359, Normal           CONTINUE these medications which have NOT CHANGED    Details   calcium carbonate (OS-PRETTY) 1250 (500 CA) MG tablet Take 1 tablet by mouth daily., Until Discontinued, Historical Med      metoprolol tartrate (LOPRESSOR) 25 mg tablet Take 0.5 tablets (12.5 mg total) by mouth every 12 (twelve) hours for 20 days, Starting Tue 3/19/2024, Until Mon 4/8/2024, Normal      Multiple Vitamin (multivitamin) tablet Take 1 tablet by mouth daily, Historical Med      omeprazole (PriLOSEC) 20 mg delayed release capsule Take 20 mg by mouth as needed, Starting Tue 12/29/2020, Until Mon 5/16/2022 at 2359, Historical Med           No discharge procedures on file.  ED  SEPSIS DOCUMENTATION   Time reflects when diagnosis was documented in both MDM as applicable and the Disposition within this note       Time User Action Codes Description Comment    1/18/2025  6:09 AM Tip Og Add [S22.42XA] Closed fracture of multiple ribs of left side, initial encounter                  Tip Og MD  01/29/25 6704

## 2025-03-01 ENCOUNTER — HOSPITAL ENCOUNTER (EMERGENCY)
Facility: HOSPITAL | Age: 70
Discharge: HOME | End: 2025-03-01
Attending: EMERGENCY MEDICINE
Payer: COMMERCIAL

## 2025-03-01 ENCOUNTER — APPOINTMENT (EMERGENCY)
Dept: CT IMAGING | Facility: HOSPITAL | Age: 70
End: 2025-03-01
Payer: COMMERCIAL

## 2025-03-01 VITALS
HEART RATE: 68 BPM | OXYGEN SATURATION: 99 % | SYSTOLIC BLOOD PRESSURE: 164 MMHG | DIASTOLIC BLOOD PRESSURE: 86 MMHG | TEMPERATURE: 98.3 F | RESPIRATION RATE: 18 BRPM | BODY MASS INDEX: 26.56 KG/M2 | HEIGHT: 68 IN | WEIGHT: 175.27 LBS

## 2025-03-01 DIAGNOSIS — R07.81 RIB PAIN: Primary | ICD-10-CM

## 2025-03-01 LAB
ANION GAP SERPL CALCULATED.3IONS-SCNC: 7 MMOL/L (ref 4–13)
BASOPHILS # BLD AUTO: 0.03 THOUSANDS/ÂΜL (ref 0–0.1)
BASOPHILS NFR BLD AUTO: 0 % (ref 0–1)
BUN SERPL-MCNC: 18 MG/DL (ref 5–25)
CALCIUM SERPL-MCNC: 9.1 MG/DL (ref 8.4–10.2)
CARDIAC TROPONIN I PNL SERPL HS: <2 NG/L (ref ?–50)
CHLORIDE SERPL-SCNC: 103 MMOL/L (ref 96–108)
CO2 SERPL-SCNC: 27 MMOL/L (ref 21–32)
CREAT SERPL-MCNC: 0.95 MG/DL (ref 0.6–1.3)
EOSINOPHIL # BLD AUTO: 0.15 THOUSAND/ÂΜL (ref 0–0.61)
EOSINOPHIL NFR BLD AUTO: 2 % (ref 0–6)
ERYTHROCYTE [DISTWIDTH] IN BLOOD BY AUTOMATED COUNT: 13.1 % (ref 11.6–15.1)
GFR SERPL CREATININE-BSD FRML MDRD: 61 ML/MIN/1.73SQ M
GLUCOSE SERPL-MCNC: 64 MG/DL (ref 65–140)
HCT VFR BLD AUTO: 34.2 % (ref 34.8–46.1)
HGB BLD-MCNC: 11 G/DL (ref 11.5–15.4)
IMM GRANULOCYTES # BLD AUTO: 0.02 THOUSAND/UL (ref 0–0.2)
IMM GRANULOCYTES NFR BLD AUTO: 0 % (ref 0–2)
LYMPHOCYTES # BLD AUTO: 2.97 THOUSANDS/ÂΜL (ref 0.6–4.47)
LYMPHOCYTES NFR BLD AUTO: 42 % (ref 14–44)
MCH RBC QN AUTO: 27.6 PG (ref 26.8–34.3)
MCHC RBC AUTO-ENTMCNC: 32.2 G/DL (ref 31.4–37.4)
MCV RBC AUTO: 86 FL (ref 82–98)
MONOCYTES # BLD AUTO: 0.58 THOUSAND/ÂΜL (ref 0.17–1.22)
MONOCYTES NFR BLD AUTO: 8 % (ref 4–12)
NEUTROPHILS # BLD AUTO: 3.41 THOUSANDS/ÂΜL (ref 1.85–7.62)
NEUTS SEG NFR BLD AUTO: 48 % (ref 43–75)
NRBC BLD AUTO-RTO: 0 /100 WBCS
PLATELET # BLD AUTO: 297 THOUSANDS/UL (ref 149–390)
PMV BLD AUTO: 9.8 FL (ref 8.9–12.7)
POTASSIUM SERPL-SCNC: 3.8 MMOL/L (ref 3.5–5.3)
RBC # BLD AUTO: 3.98 MILLION/UL (ref 3.81–5.12)
SODIUM SERPL-SCNC: 137 MMOL/L (ref 135–147)
WBC # BLD AUTO: 7.16 THOUSAND/UL (ref 4.31–10.16)

## 2025-03-01 PROCEDURE — 93005 ELECTROCARDIOGRAM TRACING: CPT

## 2025-03-01 PROCEDURE — 84484 ASSAY OF TROPONIN QUANT: CPT | Performed by: PHYSICIAN ASSISTANT

## 2025-03-01 PROCEDURE — 85025 COMPLETE CBC W/AUTO DIFF WBC: CPT | Performed by: PHYSICIAN ASSISTANT

## 2025-03-01 PROCEDURE — 99285 EMERGENCY DEPT VISIT HI MDM: CPT | Performed by: PHYSICIAN ASSISTANT

## 2025-03-01 PROCEDURE — 99285 EMERGENCY DEPT VISIT HI MDM: CPT

## 2025-03-01 PROCEDURE — 80048 BASIC METABOLIC PNL TOTAL CA: CPT | Performed by: PHYSICIAN ASSISTANT

## 2025-03-01 PROCEDURE — 71260 CT THORAX DX C+: CPT

## 2025-03-01 PROCEDURE — 36415 COLL VENOUS BLD VENIPUNCTURE: CPT | Performed by: PHYSICIAN ASSISTANT

## 2025-03-01 RX ORDER — OXYCODONE AND ACETAMINOPHEN 5; 325 MG/1; MG/1
1 TABLET ORAL EVERY 4 HOURS PRN
Qty: 9 TABLET | Refills: 0 | Status: SHIPPED | OUTPATIENT
Start: 2025-03-01 | End: 2025-03-11

## 2025-03-01 RX ORDER — LIDOCAINE 50 MG/G
1 PATCH TOPICAL ONCE
Status: DISCONTINUED | OUTPATIENT
Start: 2025-03-01 | End: 2025-03-02 | Stop reason: HOSPADM

## 2025-03-01 RX ORDER — METHOCARBAMOL 500 MG/1
500 TABLET, FILM COATED ORAL 2 TIMES DAILY
Qty: 20 TABLET | Refills: 0 | Status: SHIPPED | OUTPATIENT
Start: 2025-03-01

## 2025-03-01 RX ADMIN — IOHEXOL 85 ML: 350 INJECTION, SOLUTION INTRAVENOUS at 20:58

## 2025-03-01 RX ADMIN — LIDOCAINE 5% 1 PATCH: 700 PATCH TOPICAL at 20:12

## 2025-03-01 NOTE — Clinical Note
Martha Cunningham was seen and treated in our emergency department on 3/1/2025.    No restrictions            Diagnosis:     Martha  may return to work on return date.    She may return on this date: 03/06/2025         If you have any questions or concerns, please don't hesitate to call.      Drew Tabares, DO    ______________________________           _______________          _______________  Hospital Representative                              Date                                Time

## 2025-03-02 NOTE — DISCHARGE INSTRUCTIONS
Please follow up with your PCP.  Alternate between Tylenol and Motrin as needed.  Return to the emergency department any new or worsening symptoms.    You were seen in the emergency department for rib pain, your CT scan results are below, we provided you with a prescription for pain medication, and muscle laxer's, please pick them up at the pharmacy and take them as directed.  We provided you with a couple of days off so that you can be cleared by your primary care doctor to return to work.  Please schedule an appointment with them this week.  If your symptoms worsen or persist, please return to the emergency department immediately.  Acute to subacute appearing fractures of the anterior left third through eighth ribs., 2. Fusiform ectasia of the ascending thoracic aorta measuring up to 41 mm, previously 40 mm. Recommendation is for follow-up low radiation dose chest CT in one year.

## 2025-03-02 NOTE — ED PROVIDER NOTES
Time reflects when diagnosis was documented in both MDM as applicable and the Disposition within this note       Time User Action Codes Description Comment    3/1/2025 10:02 PM ArBillie briones Add [R07.81] Rib pain           ED Disposition       ED Disposition   Discharge    Condition   Stable    Date/Time   Sat Mar 1, 2025 10:59 PM    Comment   Martha Trupp discharge to home/self care.                   Assessment & Plan       Medical Decision Making  Amount and/or Complexity of Data Reviewed  Labs: ordered. Decision-making details documented in ED Course.  Radiology: ordered.    Risk  Prescription drug management.        ED Course as of 03/02/25 1238   Sat Mar 01, 2025   2043 WBC: 7.16   2043 Basic metabolic panel(!)  Unremarkable    2043 hs TnI 0hr: <2   2128 Patient reevaluated.  Resting comfortably.  CT pending   2203 Singed out to Dr. Tabares pending CT scan       Medications   iohexol (OMNIPAQUE) 350 MG/ML injection (MULTI-DOSE) 85 mL (85 mL Intravenous Given 3/1/25 2058)       ED Risk Strat Scores   HEART Risk Score      Flowsheet Row Most Recent Value   Heart Score Risk Calculator    History 0 Filed at: 03/01/2025 2137   ECG 0 Filed at: 03/01/2025 2137   Age 2 Filed at: 03/01/2025 2137   Risk Factors 1 Filed at: 03/01/2025 2137   Troponin 0 Filed at: 03/01/2025 2137   HEART Score 3 Filed at: 03/01/2025 2137          HEART Risk Score      Flowsheet Row Most Recent Value   Heart Score Risk Calculator    History 0 Filed at: 03/01/2025 2137   ECG 0 Filed at: 03/01/2025 2137   Age 2 Filed at: 03/01/2025 2137   Risk Factors 1 Filed at: 03/01/2025 2137   Troponin 0 Filed at: 03/01/2025 2137   HEART Score 3 Filed at: 03/01/2025 2137                              SBIRT 22yo+      Flowsheet Row Most Recent Value   Initial Alcohol Screen: US AUDIT-C     1. How often do you have a drink containing alcohol? 0 Filed at: 03/01/2025 1951   2. How many drinks containing alcohol do you have on a typical day you are drinking?   0 Filed at: 2025   3b. FEMALE Any Age, or MALE 65+: How often do you have 4 or more drinks on one occassion? 0 Filed at: 2025   Audit-C Score 0 Filed at: 2025   PANDA: How many times in the past year have you...    Used an illegal drug or used a prescription medication for non-medical reasons? Never Filed at: 2025                            History of Present Illness       Chief Complaint   Patient presents with    Rib Pain    Chest Pain     Pt reports a few weeks ago she broke some ribs on her left side after slipping in the tub and landing on her side. She claims she was doing better until she was helping her handicapped dog the other day, and felt something stabbing her in the middle of her chest and her left side. PMHx of aortic aneurysm.        Past Medical History:   Diagnosis Date    GERD (gastroesophageal reflux disease)     History of right breast cancer       Past Surgical History:   Procedure Laterality Date    BREAST LUMPECTOMY       SECTION      REPLACEMENT TOTAL KNEE Left     TOTAL HIP ARTHROPLASTY Bilateral     US GUIDED BREAST BIOPSY LEFT COMPLETE Left 2016      Family History   Problem Relation Age of Onset    Cancer Mother          at age 66    Heart attack Father          at age 74    No Known Problems Sister     Heart attack Brother          at age 60    No Known Problems Brother       Social History     Tobacco Use    Smoking status: Never    Smokeless tobacco: Never   Vaping Use    Vaping status: Never Used   Substance Use Topics    Alcohol use: Not Currently    Drug use: Never      E-Cigarette/Vaping    E-Cigarette Use Never User       E-Cigarette/Vaping Substances      I have reviewed and agree with the history as documented.     69-year-old female presents to the emergency department for evaluation of left-sided chest pain/left-sided rib pain.  Patient states she was seen in our emergency department in mid January after  slip and fall in the tub causing multiple left-sided rib fractures.  States she seemed to be healing however on Wednesday she was taking her handicapped dog out when she accidentally almost stepped on his pole causing her to jump up which caused a pain in the left side of her ribs underneath the arm as well as the front of the chest on the left side.  Patient states since she has had discomfort in these areas.  Worse with certain movements or deep breath.  Patient states she was concerned she may have injured her ribs further or her lung.  Also states she has history of aortic aneurysm which she gets checked q6-month and has been stable.  Patient denies any shortness of breath.  She denies any palpitations or leg swelling.  She denies any personal cardiac history however has significant family cardiac history.        Review of Systems   Constitutional: Negative.    Respiratory:  Negative for cough, choking, shortness of breath, wheezing and stridor.    Cardiovascular:  Positive for chest pain. Negative for palpitations and leg swelling.   Gastrointestinal: Negative.    Musculoskeletal:         Rib pain   Skin: Negative.    Neurological: Negative.    All other systems reviewed and are negative.          Objective       ED Triage Vitals [03/01/25 1948]   Temperature Pulse Blood Pressure Respirations SpO2 Patient Position - Orthostatic VS   98.3 °F (36.8 °C) 70 (!) 196/93 18 98 % Lying      Temp Source Heart Rate Source BP Location FiO2 (%) Pain Score    Temporal Monitor Left arm -- 4      Vitals      Date and Time Temp Pulse SpO2 Resp BP Pain Score FACES Pain Rating User   03/01/25 2300 -- 68 99 % 18 164/86 -- -- AD   03/01/25 2200 -- 70 96 % 18 167/75 -- -- AD   03/01/25 2100 -- 67 97 % 19 170/77 -- -- AD   03/01/25 2015 -- 65 100 % 18 146/70 -- -- AD   03/01/25 1948 98.3 °F (36.8 °C) 70 98 % 18 196/93 4 -- AR            Physical Exam  Vitals and nursing note reviewed.   Constitutional:       General: She is not in  acute distress.     Appearance: She is well-developed. She is not ill-appearing, toxic-appearing or diaphoretic.   HENT:      Head: Normocephalic.   Eyes:      Pupils: Pupils are equal, round, and reactive to light.   Cardiovascular:      Rate and Rhythm: Normal rate and regular rhythm.   Pulmonary:      Effort: Pulmonary effort is normal.      Breath sounds: Normal breath sounds. No decreased breath sounds, wheezing, rhonchi or rales.   Chest:      Chest wall: Tenderness present.       Abdominal:      Palpations: Abdomen is soft.      Tenderness: There is no abdominal tenderness.   Musculoskeletal:         General: Normal range of motion.      Cervical back: Normal range of motion.      Right lower leg: No tenderness. No edema.      Left lower leg: No tenderness. No edema.   Skin:     General: Skin is warm and dry.      Findings: No rash.   Neurological:      General: No focal deficit present.      Mental Status: She is alert and oriented to person, place, and time.   Psychiatric:         Mood and Affect: Mood normal.         Results Reviewed       Procedure Component Value Units Date/Time    HS Troponin 0hr (reflex protocol) [[id number]]  (Normal) Collected: 03/01/25 2012    Lab Status: Final result Specimen: Blood from Arm, Right Updated: 03/01/25 2039     hs TnI 0hr <2 ng/L     Basic metabolic panel [[id number]]  (Abnormal) Collected: 03/01/25 2012    Lab Status: Final result Specimen: Blood from Arm, Right Updated: 03/01/25 2031     Sodium 137 mmol/L      Potassium 3.8 mmol/L      Chloride 103 mmol/L      CO2 27 mmol/L      ANION GAP 7 mmol/L      BUN 18 mg/dL      Creatinine 0.95 mg/dL      Glucose 64 mg/dL      Calcium 9.1 mg/dL      eGFR 61 ml/min/1.73sq m     Narrative:      [employer] guidelines for Chronic Kidney Disease (CKD):     Stage 1 with normal or high GFR (GFR > 90 mL/min/1.73 square meters)    Stage 2 Mild CKD (GFR = 60-89 mL/min/1.73 square meters)    Stage 3A Moderate CKD  (GFR = 45-59 mL/min/1.73 square meters)    Stage 3B Moderate CKD (GFR = 30-44 mL/min/1.73 square meters)    Stage 4 Severe CKD (GFR = 15-29 mL/min/1.73 square meters)    Stage 5 End Stage CKD (GFR <15 mL/min/1.73 square meters)  Note: GFR calculation is accurate only with a steady state creatinine    CBC and differential [[id number]]  (Abnormal) Collected: 03/01/25 2012    Lab Status: Final result Specimen: Blood from Arm, Right Updated: 03/01/25 2018     WBC 7.16 Thousand/uL      RBC 3.98 Million/uL      Hemoglobin 11.0 g/dL      Hematocrit 34.2 %      MCV 86 fL      MCH 27.6 pg      MCHC 32.2 g/dL      RDW 13.1 %      MPV 9.8 fL      Platelets 297 Thousands/uL      nRBC 0 /100 WBCs      Segmented % 48 %      Immature Grans % 0 %      Lymphocytes % 42 %      Monocytes % 8 %      Eosinophils Relative 2 %      Basophils Relative 0 %      Absolute Neutrophils 3.41 Thousands/µL      Absolute Immature Grans 0.02 Thousand/uL      Absolute Lymphocytes 2.97 Thousands/µL      Absolute Monocytes 0.58 Thousand/µL      Eosinophils Absolute 0.15 Thousand/µL      Basophils Absolute 0.03 Thousands/µL             CT chest with contrast   Final Interpretation by Robert Collazo MD (03/01 2243)      1.  Acute to subacute appearing fractures of the anterior left third through eighth ribs.   2.  Fusiform ectasia of the ascending thoracic aorta measuring up to 41 mm, previously 40 mm. Recommendation is for follow-up low radiation dose chest CT in one year.      The study was marked in EPIC for immediate notification.         Workstation performed: [id number]             ECG 12 Lead Documentation Only    Date/Time: 3/1/2025 7:51 PM    Performed by: Billie Sebastian PA-C  Authorized by: Billie Sebastian PA-C    Patient location:  ED  Interpretation:     Interpretation: non-specific    Rate:     ECG rate:  68    ECG rate assessment: normal    Rhythm:     Rhythm: sinus rhythm    Ectopy:     Ectopy: none    QRS:     QRS axis:  Normal    QRS  intervals:  Normal  Conduction:     Conduction: normal        ED Medication and Procedure Management   Prior to Admission Medications   Prescriptions Last Dose Informant Patient Reported? Taking?   Multiple Vitamin (multivitamin) tablet   Yes No   Sig: Take 1 tablet by mouth daily   Patient not taking: Reported on 9/14/2023   calcium carbonate (OS-PRETTY) 1250 (500 CA) MG tablet   Yes No   Sig: Take 1 tablet by mouth daily.   Patient not taking: Reported on 9/14/2023   metoprolol tartrate (LOPRESSOR) 25 mg tablet   No No   Sig: Take 0.5 tablets (12.5 mg total) by mouth every 12 (twelve) hours for 20 days   omeprazole (PriLOSEC) 20 mg delayed release capsule   Yes No   Sig: Take 20 mg by mouth as needed      Facility-Administered Medications: None     Discharge Medication List as of 3/1/2025 11:02 PM        START taking these medications    Details   methocarbamol (ROBAXIN) 500 mg tablet Take 1 tablet (500 mg total) by mouth 2 (two) times a day, Starting Sat 3/1/2025, Normal      oxyCODONE-acetaminophen (Percocet) 5-325 mg per tablet Take 1 tablet by mouth every 4 (four) hours as needed for severe pain for up to 10 days Max Daily Amount: 6 tablets, Starting Sat 3/1/2025, Until Tue 3/11/2025 at 2359, Normal           CONTINUE these medications which have NOT CHANGED    Details   calcium carbonate (OS-PRETTY) 1250 (500 CA) MG tablet Take 1 tablet by mouth daily., Until Discontinued, Historical Med      metoprolol tartrate (LOPRESSOR) 25 mg tablet Take 0.5 tablets (12.5 mg total) by mouth every 12 (twelve) hours for 20 days, Starting Tue 3/19/2024, Until Mon 4/8/2024, Normal      Multiple Vitamin (multivitamin) tablet Take 1 tablet by mouth daily, Historical Med      omeprazole (PriLOSEC) 20 mg delayed release capsule Take 20 mg by mouth as needed, Starting Tue 12/29/2020, Until Mon 5/16/2022 at 2359, Historical Med           No discharge procedures on file.  ED SEPSIS DOCUMENTATION   Time reflects when diagnosis was  documented in both MDM as applicable and the Disposition within this note       Time User Action Codes Description Comment    3/1/2025 10:02 PM Billie Sebastian Add [R07.81] Rib pain                  Billie Sebastian PA-C  03/02/25 1233

## 2025-03-03 LAB
ATRIAL RATE: 68 BPM
P AXIS: 68 DEGREES
PR INTERVAL: 200 MS
QRS AXIS: 14 DEGREES
QRSD INTERVAL: 80 MS
QT INTERVAL: 402 MS
QTC INTERVAL: 427 MS
T WAVE AXIS: 80 DEGREES
VENTRICULAR RATE: 68 BPM

## 2025-07-22 ENCOUNTER — HOSPITAL ENCOUNTER (OUTPATIENT)
Dept: CT IMAGING | Facility: HOSPITAL | Age: 70
Discharge: HOME | End: 2025-07-22
Attending: INTERNAL MEDICINE
Payer: COMMERCIAL

## 2025-07-22 DIAGNOSIS — R07.9 CHEST PAIN, UNSPECIFIED TYPE: ICD-10-CM

## 2025-07-22 PROCEDURE — 75571 CT HRT W/O DYE W/CA TEST: CPT

## 2025-07-26 ENCOUNTER — APPOINTMENT (OUTPATIENT)
Dept: LAB | Facility: HOSPITAL | Age: 70
End: 2025-07-26
Payer: COMMERCIAL

## 2025-07-26 DIAGNOSIS — E78.2 MIXED HYPERLIPIDEMIA: ICD-10-CM

## 2025-07-26 LAB
ANION GAP SERPL CALCULATED.3IONS-SCNC: 8 MMOL/L (ref 4–13)
BUN SERPL-MCNC: 14 MG/DL (ref 5–25)
CALCIUM SERPL-MCNC: 9.5 MG/DL (ref 8.4–10.2)
CHLORIDE SERPL-SCNC: 107 MMOL/L (ref 96–108)
CHOLEST SERPL-MCNC: 207 MG/DL (ref ?–200)
CO2 SERPL-SCNC: 24 MMOL/L (ref 21–32)
CREAT SERPL-MCNC: 0.95 MG/DL (ref 0.6–1.3)
GFR SERPL CREATININE-BSD FRML MDRD: 61 ML/MIN/1.73SQ M
GLUCOSE P FAST SERPL-MCNC: 93 MG/DL (ref 65–99)
HDLC SERPL-MCNC: 63 MG/DL
LDLC SERPL CALC-MCNC: 129 MG/DL (ref 0–100)
POTASSIUM SERPL-SCNC: 4.3 MMOL/L (ref 3.5–5.3)
SODIUM SERPL-SCNC: 139 MMOL/L (ref 135–147)
TRIGL SERPL-MCNC: 77 MG/DL (ref ?–150)

## 2025-07-26 PROCEDURE — 36415 COLL VENOUS BLD VENIPUNCTURE: CPT

## 2025-07-26 PROCEDURE — 80048 BASIC METABOLIC PNL TOTAL CA: CPT

## 2025-07-26 PROCEDURE — 80061 LIPID PANEL: CPT

## 2025-08-04 ENCOUNTER — HOSPITAL ENCOUNTER (OUTPATIENT)
Dept: NON INVASIVE DIAGNOSTICS | Facility: HOSPITAL | Age: 70
Discharge: HOME | End: 2025-08-04
Attending: INTERNAL MEDICINE
Payer: COMMERCIAL

## 2025-08-04 DIAGNOSIS — R07.9 CHEST PAIN, UNSPECIFIED TYPE: ICD-10-CM

## 2025-08-04 LAB
MAX HR PERCENT: 107 %
MAX HR: 162 BPM
RATE PRESSURE PRODUCT: NORMAL
SL CV STRESS RECOVERY BP: NORMAL MMHG
SL CV STRESS RECOVERY HR: 99 BPM
SL CV STRESS RECOVERY O2 SAT: 98 %
SL CV STRESS STAGE REACHED: 3
STRESS ANGINA INDEX: 0
STRESS BASELINE BP: NORMAL MMHG
STRESS BASELINE HR: 83 BPM
STRESS O2 SAT REST: 98 %
STRESS PEAK HR: 162 BPM
STRESS POST ESTIMATED WORKLOAD: 10.1 METS
STRESS POST EXERCISE DUR MIN: 9 MIN
STRESS POST EXERCISE DUR SEC: 0 SEC
STRESS POST O2 SAT PEAK: 97 %
STRESS POST PEAK BP: 170 MMHG

## 2025-08-04 PROCEDURE — 93017 CV STRESS TEST TRACING ONLY: CPT

## 2025-08-04 RX ORDER — LOSARTAN POTASSIUM 50 MG/1
TABLET ORAL
COMMUNITY
Start: 2025-07-28

## 2025-08-05 LAB
CHEST PAIN STATEMENT: NORMAL
MAX DIASTOLIC BP: 80 MMHG
MAX PREDICTED HEART RATE: 151 BPM
PROTOCOL NAME: NORMAL
REASON FOR TERMINATION: NORMAL
STRESS POST EXERCISE DUR MIN: 9 MIN
STRESS POST EXERCISE DUR SEC: 0 SEC
STRESS POST PEAK HR: 162 BPM
STRESS POST PEAK SYSTOLIC BP: 170 MMHG
TARGET HR FORMULA: NORMAL
TEST INDICATION: NORMAL